# Patient Record
Sex: FEMALE | Race: WHITE | NOT HISPANIC OR LATINO | Employment: PART TIME | URBAN - METROPOLITAN AREA
[De-identification: names, ages, dates, MRNs, and addresses within clinical notes are randomized per-mention and may not be internally consistent; named-entity substitution may affect disease eponyms.]

---
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
## 2019-10-24 ENCOUNTER — HOSPITAL ENCOUNTER (EMERGENCY)
Facility: HOSPITAL | Age: 19
Discharge: HOME | End: 2019-10-25
Attending: EMERGENCY MEDICINE
Payer: MEDICAID

## 2019-10-24 VITALS
SYSTOLIC BLOOD PRESSURE: 126 MMHG | DIASTOLIC BLOOD PRESSURE: 79 MMHG | TEMPERATURE: 100 F | RESPIRATION RATE: 20 BRPM | BODY MASS INDEX: 22.49 KG/M2 | WEIGHT: 135 LBS | OXYGEN SATURATION: 99 % | HEIGHT: 65 IN | HEART RATE: 104 BPM

## 2019-10-24 DIAGNOSIS — R05.9 COUGH: ICD-10-CM

## 2019-10-24 DIAGNOSIS — J20.9 ACUTE BRONCHITIS, UNSPECIFIED ORGANISM: Primary | ICD-10-CM

## 2019-10-24 LAB
B-HCG UR QL: NEGATIVE
CTP QC/QA: YES
INFLUENZA A, MOLECULAR: NEGATIVE
INFLUENZA B, MOLECULAR: NEGATIVE
SPECIMEN SOURCE: NORMAL

## 2019-10-24 PROCEDURE — 81025 URINE PREGNANCY TEST: CPT | Performed by: EMERGENCY MEDICINE

## 2019-10-24 PROCEDURE — 99284 EMERGENCY DEPT VISIT MOD MDM: CPT | Mod: 25

## 2019-10-24 PROCEDURE — 87502 INFLUENZA DNA AMP PROBE: CPT

## 2019-10-24 PROCEDURE — 25000003 PHARM REV CODE 250: Performed by: EMERGENCY MEDICINE

## 2019-10-24 RX ORDER — IBUPROFEN 400 MG/1
400 TABLET ORAL
Status: COMPLETED | OUTPATIENT
Start: 2019-10-24 | End: 2019-10-24

## 2019-10-24 RX ADMIN — IBUPROFEN 400 MG: 400 TABLET ORAL at 11:10

## 2019-10-25 PROBLEM — J20.9 ACUTE BRONCHITIS: Status: ACTIVE | Noted: 2019-10-25

## 2019-10-25 RX ORDER — AZITHROMYCIN 250 MG/1
250 TABLET, FILM COATED ORAL DAILY
Qty: 6 TABLET | Refills: 0 | Status: SHIPPED | OUTPATIENT
Start: 2019-10-25 | End: 2022-08-16

## 2019-10-25 NOTE — ED PROVIDER NOTES
Encounter Date: 10/24/2019       History 19-year-old well-appearing female presents to the emergency department with a complaint of intermittent cough, congestion, sore throat for the last 2 weeks.  Patient reports recently exposed to influenza.  Patient reports subjective fever     Chief Complaint   Patient presents with    Cough     FACIAL FLUSH     HPI  Review of patient's allergies indicates:  No Known Allergies  Past Medical History:   Diagnosis Date    ADHD (attention deficit hyperactivity disorder)      No past surgical history on file.  No family history on file.  Social History     Tobacco Use    Smoking status: Never Smoker    Smokeless tobacco: Never Used   Substance Use Topics    Alcohol use: No    Drug use: No     Review of Systems   Constitutional: Positive for chills and fever.   HENT: Positive for congestion, postnasal drip and sore throat.    Eyes: Negative.    Respiratory: Positive for cough. Negative for shortness of breath.    Cardiovascular: Negative.    Gastrointestinal: Negative.    Genitourinary: Negative.    Musculoskeletal: Negative.    Skin: Negative.    Allergic/Immunologic: Negative.    Neurological: Negative.    Hematological: Negative.    Psychiatric/Behavioral: Negative.        Physical Exam     Initial Vitals [10/24/19 2017]   BP Pulse Resp Temp SpO2   134/87 (!) 114 16 99.4 °F (37.4 °C) 98 %      MAP       --         Physical Exam    Nursing note and vitals reviewed.  Constitutional: She appears well-developed and well-nourished.   HENT:   Head: Normocephalic and atraumatic.   Right Ear: External ear normal.   Left Ear: External ear normal.   Nose: Rhinorrhea present.   Mouth/Throat: Posterior oropharyngeal erythema present.   Neck: Normal range of motion.   Cardiovascular: Regular rhythm, S1 normal, S2 normal, normal heart sounds and normal pulses.   Pulmonary/Chest: Effort normal and breath sounds normal. No respiratory distress.   Abdominal: Soft. Bowel sounds are normal.  There is no tenderness.   Skin: Skin is warm. No rash noted.   Psychiatric: She has a normal mood and affect. Her speech is normal and behavior is normal. Thought content normal.         ED Course   Procedures  Labs Reviewed   INFLUENZA A AND B ANTIGEN    Narrative:     Specimen Source->Nasopharyngeal Swab   POCT URINE PREGNANCY          Imaging Results          X-Ray Chest PA And Lateral (In process)                  Medical Decision Making:   Initial Assessment:   Cough congestion   Differential Diagnosis:   URI, pharyngitis, sinusitis, bronchitis, pneumonia, influenza  ED Management:  19-year-old well-appearing female presents to the emergency department with complaint of URI symptoms including rhinorrhea, nasal congestion, sore throat intermittent cough and subjective fevers.  Patient concerned because she was recently exposed to someone that had influenza.  Influenza testing here is negative for a or B.  Chest x-ray reviewed and negative for any consolidative pneumonia.  Patient is oxygenating well on room air she has no evidence of respiratory distress. Patient denies pain with deep breathing.  Patient will be placed on an antibiotic she was given detailed return precautions.                      Clinical Impression:       ICD-10-CM ICD-9-CM   1. Acute bronchitis, unspecified organism J20.9 466.0   2. Cough R05 786.2                                RODRIGO Sotelo  10/25/19 0006

## 2019-10-25 NOTE — DISCHARGE INSTRUCTIONS
Motrin every 6 hr for fever, Tylenol every 4 hr  Zithromax as directed until all gone  Return for any concerns or if condition becomes worse

## 2020-02-15 ENCOUNTER — HOSPITAL ENCOUNTER (EMERGENCY)
Facility: HOSPITAL | Age: 20
Discharge: HOME | End: 2020-02-15
Attending: EMERGENCY MEDICINE
Payer: MEDICAID

## 2020-02-15 VITALS
TEMPERATURE: 98 F | RESPIRATION RATE: 16 BRPM | HEART RATE: 80 BPM | WEIGHT: 130 LBS | SYSTOLIC BLOOD PRESSURE: 137 MMHG | HEIGHT: 64 IN | DIASTOLIC BLOOD PRESSURE: 82 MMHG | BODY MASS INDEX: 22.2 KG/M2 | OXYGEN SATURATION: 100 %

## 2020-02-15 DIAGNOSIS — S09.90XA INJURY OF HEAD, INITIAL ENCOUNTER: Primary | ICD-10-CM

## 2020-02-15 DIAGNOSIS — S06.0X0A CONCUSSION WITHOUT LOSS OF CONSCIOUSNESS, INITIAL ENCOUNTER: ICD-10-CM

## 2020-02-15 LAB
B-HCG UR QL: NEGATIVE
CTP QC/QA: YES

## 2020-02-15 PROCEDURE — 81025 URINE PREGNANCY TEST: CPT | Performed by: EMERGENCY MEDICINE

## 2020-02-15 PROCEDURE — 99284 EMERGENCY DEPT VISIT MOD MDM: CPT | Mod: 25

## 2020-02-15 RX ORDER — BUTALBITAL, ACETAMINOPHEN AND CAFFEINE 50; 325; 40 MG/1; MG/1; MG/1
1 TABLET ORAL EVERY 4 HOURS PRN
Qty: 12 TABLET | Refills: 0 | Status: SHIPPED | OUTPATIENT
Start: 2020-02-15 | End: 2020-03-16

## 2020-02-16 NOTE — ED PROVIDER NOTES
Encounter Date: 2/15/2020       History     Chief Complaint   Patient presents with    Head Injury     states fell out of bed hitting back of head on dresser around 1am, denies loc, had episode of vomiting this am    Headache     Patient presents complaining of headache, vomiting. Patient states she experienced a fall out of the bed and struck her head on the dresser earlier this morning that had episode of vomiting has persisting headaches since.  She denies any blurry vision.  She currently is not nauseated.  No numbness tingling.  At the worst symptoms are moderate.  She took 2 Aleve this morning with minimal relief.        Review of patient's allergies indicates:  No Known Allergies  Past Medical History:   Diagnosis Date    ADHD (attention deficit hyperactivity disorder)     Hypertension      History reviewed. No pertinent surgical history.  History reviewed. No pertinent family history.  Social History     Tobacco Use    Smoking status: Never Smoker    Smokeless tobacco: Never Used   Substance Use Topics    Alcohol use: No    Drug use: No     Review of Systems   Neurological: Positive for dizziness and headaches.   All other systems reviewed and are negative.      Physical Exam     Initial Vitals [02/15/20 1803]   BP Pulse Resp Temp SpO2   137/82 80 16 97.7 °F (36.5 °C) 100 %      MAP       --         Physical Exam    Nursing note and vitals reviewed.  Constitutional: She appears well-developed and well-nourished.   HENT:   Head: Normocephalic.   Patient has mild tenderness to the occipital scalp.  No definitive step-off or deformity   Eyes: EOM are normal.   Neck: Normal range of motion. Neck supple.   Cardiovascular: Normal rate, regular rhythm, normal heart sounds and intact distal pulses.   Pulmonary/Chest: Breath sounds normal. No respiratory distress.   Abdominal: Soft.   Neurological: She is alert and oriented to person, place, and time. She has normal strength. No cranial nerve deficit or  sensory deficit.   Skin: Skin is warm and dry. Capillary refill takes less than 2 seconds.   Psychiatric: She has a normal mood and affect. Her behavior is normal. Judgment and thought content normal.         ED Course   Procedures  Labs Reviewed - No data to display       Imaging Results    None          Medical Decision Making:   ED Management:  Patient have symptoms of mild concussion.                                 Clinical Impression:       ICD-10-CM ICD-9-CM   1. Injury of head, initial encounter S09.90XA 959.01   2. Concussion without loss of consciousness, initial encounter S06.0X0A 850.0                             Mike Padgett MD  02/16/20 1819

## 2020-02-25 ENCOUNTER — HOSPITAL ENCOUNTER (EMERGENCY)
Facility: HOSPITAL | Age: 20
Discharge: HOME | End: 2020-02-25
Attending: EMERGENCY MEDICINE
Payer: MEDICAID

## 2020-02-25 VITALS
WEIGHT: 130 LBS | TEMPERATURE: 98 F | BODY MASS INDEX: 22.2 KG/M2 | HEIGHT: 64 IN | OXYGEN SATURATION: 99 % | SYSTOLIC BLOOD PRESSURE: 112 MMHG | DIASTOLIC BLOOD PRESSURE: 70 MMHG | RESPIRATION RATE: 17 BRPM | HEART RATE: 83 BPM

## 2020-02-25 DIAGNOSIS — M79.672 FOOT PAIN, LEFT: Primary | ICD-10-CM

## 2020-02-25 DIAGNOSIS — R52 PAIN: ICD-10-CM

## 2020-02-25 LAB
B-HCG UR QL: NEGATIVE
CTP QC/QA: YES

## 2020-02-25 PROCEDURE — 99283 EMERGENCY DEPT VISIT LOW MDM: CPT | Mod: 25

## 2020-02-25 PROCEDURE — 25000003 PHARM REV CODE 250: Performed by: NURSE PRACTITIONER

## 2020-02-25 PROCEDURE — 81025 URINE PREGNANCY TEST: CPT | Performed by: EMERGENCY MEDICINE

## 2020-02-25 RX ORDER — NAPROXEN 250 MG/1
500 TABLET ORAL
Status: COMPLETED | OUTPATIENT
Start: 2020-02-25 | End: 2020-02-25

## 2020-02-25 RX ORDER — DICLOFENAC SODIUM 50 MG/1
50 TABLET, DELAYED RELEASE ORAL 3 TIMES DAILY PRN
Qty: 20 TABLET | Refills: 2 | Status: SHIPPED | OUTPATIENT
Start: 2020-02-25 | End: 2022-08-16

## 2020-02-25 RX ADMIN — NAPROXEN 500 MG: 250 TABLET ORAL at 10:02
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
## 2020-03-30 ENCOUNTER — CLINICAL SUPPORT (OUTPATIENT)
Dept: URGENT CARE | Facility: CLINIC | Age: 20
End: 2020-03-30
Payer: MEDICAID

## 2020-03-30 VITALS
WEIGHT: 139 LBS | HEIGHT: 64 IN | OXYGEN SATURATION: 99 % | RESPIRATION RATE: 16 BRPM | BODY MASS INDEX: 23.73 KG/M2 | SYSTOLIC BLOOD PRESSURE: 112 MMHG | HEART RATE: 75 BPM | TEMPERATURE: 99 F | DIASTOLIC BLOOD PRESSURE: 68 MMHG

## 2020-03-30 DIAGNOSIS — J02.9 SORE THROAT: ICD-10-CM

## 2020-03-30 DIAGNOSIS — J02.9 VIRAL PHARYNGITIS: Primary | ICD-10-CM

## 2020-03-30 LAB
CTP QC/QA: YES
CTP QC/QA: YES
FLUAV AG NPH QL: NEGATIVE
FLUBV AG NPH QL: NEGATIVE
S PYO RRNA THROAT QL PROBE: NEGATIVE

## 2020-03-30 PROCEDURE — 87804 INFLUENZA ASSAY W/OPTIC: CPT | Mod: QW,,, | Performed by: NURSE PRACTITIONER

## 2020-03-30 PROCEDURE — 99204 PR OFFICE/OUTPT VISIT, NEW, LEVL IV, 45-59 MIN: ICD-10-PCS | Mod: 25,S$GLB,, | Performed by: NURSE PRACTITIONER

## 2020-03-30 PROCEDURE — 87880 STREP A ASSAY W/OPTIC: CPT | Mod: QW,,, | Performed by: NURSE PRACTITIONER

## 2020-03-30 PROCEDURE — 99204 OFFICE O/P NEW MOD 45 MIN: CPT | Mod: 25,S$GLB,, | Performed by: NURSE PRACTITIONER

## 2020-03-30 PROCEDURE — 87804 POCT INFLUENZA A/B: ICD-10-PCS | Mod: 59,QW,, | Performed by: NURSE PRACTITIONER

## 2020-03-30 PROCEDURE — 87880 POCT RAPID STREP A: ICD-10-PCS | Mod: QW,,, | Performed by: NURSE PRACTITIONER

## 2020-03-30 NOTE — PATIENT INSTRUCTIONS
Viral Pharyngitis (Sore Throat)    You (or your child, if your child is the patient) have pharyngitis (sore throat). This infection is caused by a virus. It can cause throat pain that is worse when swallowing, aching all over, headache, and fever. The infection may be spread by coughing, kissing, or touching others after touching your mouth or nose. Antibiotic medications do not work against viruses, so they are not used for treating this condition.  Home care  · If your symptoms are severe, rest at home. Return to work or school when you feel well enough.   · Drink plenty of fluids to avoid dehydration.  · For children: Use acetaminophen for fever, fussiness or discomfort. In infants over six months of age, you may use ibuprofen instead of acetaminophen. (NOTE: If your child has chronic liver or kidney disease or ever had a stomach ulcer or GI bleeding, talk with your doctor before using these medicines.) (NOTE: Aspirin should never be used in anyone under 18 years of age who is ill with a fever. It may cause severe liver damage.)   · For adults: You may use acetaminophen or ibuprofen to control pain or fever, unless another medicine was prescribed for this. (NOTE: If you have chronic liver or kidney disease or ever had a stomach ulcer or GI bleeding, talk with your doctor before using these medicines.)  · Throat lozenges or numbing throat sprays can help reduce pain. Gargling with warm salt water will also help reduce throat pain. For this, dissolve 1/2 teaspoon of salt in 1 glass of warm water. To help soothe a sore throat, children can sip on juice or a popsicle. Children 5 years and older can also suck on a lollipop or hard candy.  · Avoid salty or spicy foods, which can be irritating to the throat.  Follow-up care  Follow up with your healthcare provider or our staff if you are not improving over the next week.  When to seek medical advice  Call your healthcare provider right away if any of these  occur:  · Fever as directed by your doctor.  For children, seek care if:  ¨ Your child is of any age and has repeated fevers above 104°F (40°C).  ¨ Your child is younger than 2 years of age and has a fever of 100.4°F (38°C) that continues for more than 1 day.  ¨ Your child is 2 years old or older and has a fever of 100.4°F (38°C) that continues for more than 3 days.  · New or worsening ear pain, sinus pain, or headache  · Painful lumps in the back of neck  · Stiff neck  · Lymph nodes are getting larger  · Inability to swallow liquids, excessive drooling, or inability to open mouth wide due to throat pain  · Signs of dehydration (very dark urine or no urine, sunken eyes, dizziness)  · Trouble breathing or noisy breathing  · Muffled voice  · New rash  · Child appears to be getting sicker  Date Last Reviewed: 4/13/2015  © 6476-8720 [employer]. [address] [place] [address], [place] [address]. All rights reserved. This information is not intended as a substitute for professional medical care. Always follow your healthcare professional's instructions.

## 2020-03-30 NOTE — PROGRESS NOTES
"Subjective:       Patient ID: Jose G Fischer is a 19 y.o. female.    Vitals:  height is 5' 4" (1.626 m) and weight is 63 kg (139 lb). Her oral temperature is 98.7 °F (37.1 °C). Her blood pressure is 112/68 and her pulse is 75. Her respiration is 16 and oxygen saturation is 99%.     Chief Complaint: Sore Throat    Jose G Fischer is a 19 year old female presenting to the clinic with a 2-3 day history of sore throat. She has had no fever or other URI symptoms and is tolerating PO intake without difficulty. She has taken ibuprofen for her symptoms with minimal relief.     Sore Throat    This is a new problem. The current episode started in the past 7 days. The problem has been gradually worsening. There has been no fever. Pertinent negatives include no congestion, coughing, diarrhea, headaches, shortness of breath, trouble swallowing or vomiting. She has tried nothing for the symptoms.       Constitution: Negative for chills, fatigue and fever.   HENT: Positive for sore throat. Negative for congestion and trouble swallowing.    Neck: Negative for painful lymph nodes.   Cardiovascular: Negative for chest pain and leg swelling.   Eyes: Negative for double vision and blurred vision.   Respiratory: Negative for cough and shortness of breath.    Gastrointestinal: Negative for nausea, vomiting and diarrhea.   Genitourinary: Negative for dysuria, frequency, urgency and history of kidney stones.   Musculoskeletal: Negative for joint pain, joint swelling, muscle cramps and muscle ache.   Skin: Negative for color change, pale, rash and bruising.   Allergic/Immunologic: Negative for seasonal allergies.   Neurological: Negative for dizziness, history of vertigo, light-headedness, passing out and headaches.   Hematologic/Lymphatic: Negative for swollen lymph nodes.   Psychiatric/Behavioral: Negative for nervous/anxious, sleep disturbance and depression. The patient is not nervous/anxious.        Objective:      Physical Exam "   Constitutional: She is oriented to person, place, and time. She appears well-developed and well-nourished. She is cooperative.  Non-toxic appearance. She does not appear ill. No distress.   HENT:   Head: Normocephalic and atraumatic.   Right Ear: Hearing, tympanic membrane, external ear and ear canal normal.   Left Ear: Hearing, tympanic membrane, external ear and ear canal normal.   Nose: Nose normal. No mucosal edema, rhinorrhea or nasal deformity. No epistaxis. Right sinus exhibits no maxillary sinus tenderness and no frontal sinus tenderness. Left sinus exhibits no maxillary sinus tenderness and no frontal sinus tenderness.   Mouth/Throat: Uvula is midline, oropharynx is clear and moist and mucous membranes are normal. No trismus in the jaw. Normal dentition. No uvula swelling. No posterior oropharyngeal erythema. Tonsils are 2+ on the right. Tonsils are 2+ on the left.   Eyes: Conjunctivae and lids are normal. Right eye exhibits no discharge. Left eye exhibits no discharge. No scleral icterus.   Neck: Trachea normal, normal range of motion, full passive range of motion without pain and phonation normal. Neck supple.   Cardiovascular: Normal rate, regular rhythm, normal heart sounds, intact distal pulses and normal pulses.   Pulmonary/Chest: Effort normal and breath sounds normal. No respiratory distress.   Abdominal: Soft. Normal appearance and bowel sounds are normal. She exhibits no distension, no pulsatile midline mass and no mass. There is no tenderness.   Musculoskeletal: Normal range of motion. She exhibits no edema or deformity.   Neurological: She is alert and oriented to person, place, and time. She exhibits normal muscle tone. Coordination normal.   Skin: Skin is warm, dry, intact, not diaphoretic and not pale.   Psychiatric: She has a normal mood and affect. Her speech is normal and behavior is normal. Judgment and thought content normal. Cognition and memory are normal.   Nursing note and vitals  reviewed.        Assessment:       1. Viral pharyngitis    2. Sore throat        Plan:       The patient's symptoms are consistent with viral pharyngitis.  I do not think the patient has strep pharyngitis based upon the [facility] Criteria.  The patient doesn't appear to have any stridor, drooling, hoarseness, uvular deviation, facial swelling, meningismus to suggest peritonsillar abscess, retropharyngeal abscess, epiglotitis, meningitis, airway compromise.  Will treat with supportive care.  Will send throat culture and notify patient of abnormal results.     Viral pharyngitis    Sore throat  -     POCT rapid strep A  -     POCT Influenza A/B  -     Culture, Aerobic and Anaerobic

## 2020-04-03 LAB
BACTERIA SPEC AEROBE CULT: ABNORMAL
BACTERIA SPEC ANAEROBE CULT: ABNORMAL
BACTERIA SPEC CULT: ABNORMAL
BACTERIA SPEC CULT: ABNORMAL

## 2020-07-01 ENCOUNTER — CLINICAL SUPPORT (OUTPATIENT)
Dept: URGENT CARE | Facility: CLINIC | Age: 20
End: 2020-07-01
Payer: MEDICAID

## 2020-07-01 VITALS
DIASTOLIC BLOOD PRESSURE: 77 MMHG | WEIGHT: 138 LBS | RESPIRATION RATE: 18 BRPM | OXYGEN SATURATION: 98 % | BODY MASS INDEX: 23.56 KG/M2 | SYSTOLIC BLOOD PRESSURE: 118 MMHG | HEIGHT: 64 IN | TEMPERATURE: 98 F | HEART RATE: 76 BPM

## 2020-07-01 DIAGNOSIS — R51.9 ACUTE NONINTRACTABLE HEADACHE, UNSPECIFIED HEADACHE TYPE: Primary | ICD-10-CM

## 2020-07-01 PROCEDURE — 99214 PR OFFICE/OUTPT VISIT, EST, LEVL IV, 30-39 MIN: ICD-10-PCS | Mod: S$GLB,,, | Performed by: NURSE PRACTITIONER

## 2020-07-01 PROCEDURE — 99214 OFFICE O/P EST MOD 30 MIN: CPT | Mod: S$GLB,,, | Performed by: NURSE PRACTITIONER

## 2020-07-01 RX ORDER — KETOROLAC TROMETHAMINE 30 MG/ML
30 INJECTION, SOLUTION INTRAMUSCULAR; INTRAVENOUS
Status: COMPLETED | OUTPATIENT
Start: 2020-07-01 | End: 2020-07-01

## 2020-07-01 RX ORDER — DICLOFENAC SODIUM 50 MG/1
50 TABLET, DELAYED RELEASE ORAL 3 TIMES DAILY PRN
Qty: 30 TABLET | Refills: 0 | Status: SHIPPED | OUTPATIENT
Start: 2020-07-01 | End: 2022-08-16

## 2020-07-01 RX ADMIN — KETOROLAC TROMETHAMINE 30 MG: 30 INJECTION, SOLUTION INTRAMUSCULAR; INTRAVENOUS at 03:07

## 2020-07-01 NOTE — LETTER
July 1, 2020      [facility]  [address]  [facility] [facility] [place] [address]  Phone: [phone]       Patient: Jose G Fischer   YOB: 2000  Date of Visit: 07/01/2020    To Whom It May Concern:    Lyly Fischer  was at [facility] on 07/01/2020. She may return to work/school on 7/2/2020 with no restrictions. If you have any questions or concerns, or if I can be of further assistance, please do not hesitate to contact me.    Sincerely,    RODRIGO Toro

## 2020-07-01 NOTE — PATIENT INSTRUCTIONS
"  Self-Care for Headaches  Most headaches aren't serious and can be relieved with self-care. But some headaches may be a sign of another health problem like eye trouble or high blood pressure. To find the best treatment, learn what kind of headaches you get. For tension headaches, self-care will usually help. To treat migraines, ask your healthcare provider for advice. It is also possible to get both tension and migraine headaches. Self-care involves relieving the pain and avoiding headache triggers if you can.    Ways to reduce pain and tension  Try these steps:  · Apply a cold compress or ice pack to the pain site.  · Drink fluids. If nausea makes it hard to drink, try sucking on ice.  · Rest. Protect yourself from bright light and loud noises.  · Calm your emotions by imagining a peaceful scene.  · Massage tight neck, shoulder, and head muscles.  · To relax muscles, soak in a hot bath or use a hot shower.  Use medicines  Aspirin or aspirin substitutes, such as ibuprofen and acetaminophen, can relieve headache. Remember: Never give aspirin to anyone 18 years old or younger because of the risk of developing Reye syndrome. Use pain medicines only when necessary.  Track your headaches  Keeping a headache diary can help you and your healthcare provider identify what's causing your headaches:  · Note when each headache happens.  · Identify your activities and the foods you've eaten 6 to 8 hours before the headache began.  · Look for any trends or "triggers."  Signs of tension headache  Any of the following can be signs:  · Dull pain or feeling of pressure in a tight band around your head  · Pain in your neck or shoulders  · Headache without a definite beginning or end  · Headache after an activity such as driving or working on a computer  Signs of migraine  Any of the following can be signs:  · Throbbing pain on one or both sides of your head  · Nausea or vomiting  · Extreme sensitivity to light, sound, and " "smells  · Bright spots, flashes, or other visual changes  · Pain or nausea so severe that you can't continue your daily activities  Call your healthcare provider   If you have any of the following symptoms, contact your healthcare provider:  · A headache that lingers after a recent injury or bump to the head.  · A fever with a stiff neck or pain when you bend your head toward your chest.  · A headache along with slurred speech, changes in your vision, or numbness or weakness in your arms or legs.  · A headache for longer than 3 days.  · Frequent headaches, especially in the morning.  · Headaches with seizures   · Seek immediate medical attention if you have a headache that you would call "the worst headache you have ever had."   Date Last Reviewed: 10/4/2015  © 6723-1168 [employer]. [address] [place] [address], [place] [address]. All rights reserved. This information is not intended as a substitute for professional medical care. Always follow your healthcare professional's instructions.        "

## 2020-07-01 NOTE — PROGRESS NOTES
"Subjective:       Patient ID: Jose G Fischer is a 20 y.o. female.    Vitals:  height is 5' 4" (1.626 m) and weight is 62.6 kg (138 lb). Her oral temperature is 98.1 °F (36.7 °C). Her blood pressure is 118/77 and her pulse is 76. Her respiration is 18 and oxygen saturation is 98%.     Chief Complaint: Migraine (Worse from yesterday)    Pt states she woke up with a headache yesterday morning and took 800mg of ibuprofen with relief. Pt states that when she woke up again this morning, she had the headache again. Pt describes the headache as across her forehead and on both sides of her head with throbbing and tightening. Reports taking 800mg of ibuprofen again this morning with mild improvement. Endorses light sensitivity upon wakening this morning, but not at this time. Denies N,V, blurry vision, or sound sensitivity.     Migraine   This is a new problem. The current episode started yesterday. The problem occurs constantly. The problem has been gradually improving. The pain quality is similar to prior headaches. The quality of the pain is described as throbbing and band-like. Pertinent negatives include no coughing, ear pain, eye redness, fever, seizures, sore throat or vomiting. Nothing aggravates the symptoms. She has tried NSAIDs for the symptoms. The treatment provided mild relief.       Constitution: Negative for appetite change, chills and fever.   HENT: Negative for ear pain, congestion and sore throat.    Neck: Negative for painful lymph nodes.   Eyes: Negative for eye discharge and eye redness.   Respiratory: Negative for cough.    Gastrointestinal: Negative for vomiting and diarrhea.   Genitourinary: Negative for dysuria.   Musculoskeletal: Negative for muscle ache.   Skin: Negative for rash.   Neurological: Positive for headaches. Negative for seizures.   Hematologic/Lymphatic: Negative for swollen lymph nodes.       Objective:      Physical Exam   Constitutional: She is oriented to person, place, and " time. She appears well-developed.  Non-toxic appearance. She does not appear ill. No distress.   HENT:   Head: Normocephalic and atraumatic.   Right Ear: Hearing, tympanic membrane, external ear and ear canal normal.   Left Ear: Hearing, tympanic membrane, external ear and ear canal normal.   Nose: Nose normal. No mucosal edema, rhinorrhea or nasal deformity. No epistaxis. Right sinus exhibits no maxillary sinus tenderness and no frontal sinus tenderness. Left sinus exhibits no maxillary sinus tenderness and no frontal sinus tenderness.   Mouth/Throat: Uvula is midline, oropharynx is clear and moist and mucous membranes are normal. No trismus in the jaw. Normal dentition. No uvula swelling. No posterior oropharyngeal erythema.   Eyes: Pupils are equal, round, and reactive to light. Conjunctivae, EOM and lids are normal. No scleral icterus.   Neck: Trachea normal, normal range of motion, full passive range of motion without pain and phonation normal. Neck supple. No neck rigidity.   Cardiovascular: Normal rate, regular rhythm, normal heart sounds and normal pulses.   Pulmonary/Chest: Effort normal and breath sounds normal. No respiratory distress.   Abdominal: Soft. Normal appearance and bowel sounds are normal. She exhibits no distension. There is no abdominal tenderness.   Musculoskeletal: Normal range of motion.         General: No deformity.   Neurological: She is alert and oriented to person, place, and time. No cranial nerve deficit. She exhibits normal muscle tone. Coordination normal.   Skin: Skin is warm, dry, intact, not diaphoretic and not pale.   Psychiatric: Her speech is normal and behavior is normal. Judgment and thought content normal.   Nursing note and vitals reviewed.        Assessment:       1. Acute nonintractable headache, unspecified headache type        Plan:         Acute nonintractable headache, unspecified headache type    Other orders  -     ketorolac injection 30 mg  -     diclofenac  (VOLTAREN) 50 MG EC tablet; Take 1 tablet (50 mg total) by mouth 3 (three) times daily as needed.  Dispense: 30 tablet; Refill: 0

## 2020-09-10 ENCOUNTER — OFFICE VISIT (OUTPATIENT)
Dept: URGENT CARE | Facility: CLINIC | Age: 20
End: 2020-09-10
Payer: MEDICAID

## 2020-09-10 VITALS
BODY MASS INDEX: 22.88 KG/M2 | OXYGEN SATURATION: 99 % | HEIGHT: 64 IN | WEIGHT: 134 LBS | HEART RATE: 82 BPM | SYSTOLIC BLOOD PRESSURE: 126 MMHG | RESPIRATION RATE: 16 BRPM | DIASTOLIC BLOOD PRESSURE: 82 MMHG

## 2020-09-10 DIAGNOSIS — R00.2 PALPITATION: Primary | ICD-10-CM

## 2020-09-10 PROCEDURE — 99214 OFFICE O/P EST MOD 30 MIN: CPT | Mod: S$GLB,,, | Performed by: NURSE PRACTITIONER

## 2020-09-10 PROCEDURE — 93000 ELECTROCARDIOGRAM COMPLETE: CPT | Mod: S$GLB,,, | Performed by: NURSE PRACTITIONER

## 2020-09-10 PROCEDURE — 93000 PR ELECTROCARDIOGRAM, COMPLETE: ICD-10-PCS | Mod: S$GLB,,, | Performed by: NURSE PRACTITIONER

## 2020-09-10 PROCEDURE — 99214 PR OFFICE/OUTPT VISIT, EST, LEVL IV, 30-39 MIN: ICD-10-PCS | Mod: S$GLB,,, | Performed by: NURSE PRACTITIONER

## 2020-09-11 NOTE — PATIENT INSTRUCTIONS
"  Heart Palpitations    Palpitations are the feeling that your heart is beating hard, fast, or irregular. Some describe it as "pounding" or "skipped beats." Palpitations may occur in someone with heart disease, but can also occur in a healthy person.  Heart-related causes:  · Arrhythmia (a change from the heart's normal rhythm)  · Heart valve disease  · Disease of the heart muscle  · Coronary artery disease  · High blood pressure  Non-heart-related causes:  · Certain medicines (such as asthma inhalers and decongestants)  · Some herbal supplements, energy drinks and pills, and weight loss pills  · Illegal stimulant drugs (such as cocaine, crank, methamphetamine, PCP, bath salts, ecstasy)  · Caffeine, alcohol, and tobacco  · Medical conditions such as thyroid disease, anemia, anxiety, and panic disorder  Sometimes the cause can't be found.  Home care  Follow these home care tips:  · Avoid excess caffeine, alcohol, tobacco, and any stimulant drugs.  · Tell your doctor about any prescription or over-the-counter or herbal medicines you take.  Follow-up care  · Follow up with your doctor, or as advised.  Call 911  This is the fastest and safest way to get to the emergency department. The paramedics can also begin treatment on the way to the hospital, if needed.  Don't wait until your symptoms are severe to call 911. These are reasons to call 911:  · Chest pain  · Shortness of breath  · Feeling lightheaded, faint, or dizzy  · Fainting or loss of consciousness  · Very irregular heartbeat  · Rapid heartbeat that makes you uncomfortable  · Slower than usual heart rate associated with symptoms  · Slower than usual heart rate  · Chest pain with weakness, dizziness, heavy sweating, nausea, or vomiting  · Extreme drowsiness or confusion  · Weakness of an arm or leg, or on 1 side of the face  · Difficulty with speech or vision  When to seek medical advice  Get prompt medical attention if you have palpitations and any of the " following:  · Weakness  · Dizziness  · Lightheadedness  · Fainting  Date Last Reviewed: 4/27/2016  © 7949-1120 [employer]. [address] [place] [address], [place] [address]. All rights reserved. This information is not intended as a substitute for professional medical care. Always follow your healthcare professional's instructions.

## 2020-09-11 NOTE — PROGRESS NOTES
"Subjective:       Patient ID: Jose G Fischer is a 20 y.o. female.    Vitals:  height is 5' 4" (1.626 m) and weight is 60.8 kg (134 lb). Her blood pressure is 126/82 and her pulse is 82. Her respiration is 16 and oxygen saturation is 99%.     Chief Complaint: Chest Pain    HEART RATE GOT UP  LAST NIGHT AND STAYED FOR HOURS UNABLE TO SLEEP.   HAPPENED AGAIN TODAY.  HAS CHEST PAINS DURING THIS.   SHE DOES SMOKE CIGARETTES .  Palpitations   This is a recurrent problem. The current episode started yesterday. The problem occurs intermittently. The problem has been resolved. The symptoms are aggravated by unknown (does smoke cigarettes, energy drinks). Pertinent negatives include no coughing, fever or vomiting. She has tried nothing for the symptoms. The treatment provided no relief.       Constitution: Negative for appetite change, chills and fever.   HENT: Negative for ear pain, congestion and sore throat.    Neck: Negative for painful lymph nodes.   Cardiovascular: Positive for palpitations (felt fast heart rate last night).   Eyes: Negative for eye discharge and eye redness.   Respiratory: Negative for cough.    Gastrointestinal: Negative for vomiting and diarrhea.   Genitourinary: Negative for dysuria.   Musculoskeletal: Negative for muscle ache.   Skin: Negative for rash.   Neurological: Negative for headaches and seizures.   Hematologic/Lymphatic: Negative for swollen lymph nodes.       Objective:      Physical Exam   Constitutional: She is oriented to person, place, and time. She appears well-developed. She is cooperative.  Non-toxic appearance. She does not appear ill. No distress.   HENT:   Head: Normocephalic and atraumatic.   Ears:   Right Ear: Hearing, tympanic membrane, external ear and ear canal normal.   Left Ear: Hearing, tympanic membrane, external ear and ear canal normal.   Nose: Nose normal. No mucosal edema, rhinorrhea or nasal deformity. No epistaxis. Right sinus exhibits no maxillary " sinus tenderness and no frontal sinus tenderness. Left sinus exhibits no maxillary sinus tenderness and no frontal sinus tenderness.   Mouth/Throat: Uvula is midline, oropharynx is clear and moist and mucous membranes are normal. No trismus in the jaw. Normal dentition. No uvula swelling. No posterior oropharyngeal erythema.   Eyes: Conjunctivae and lids are normal. Right eye exhibits no discharge. Left eye exhibits no discharge. No scleral icterus.   Neck: Trachea normal, normal range of motion, full passive range of motion without pain and phonation normal. Neck supple.   Cardiovascular: Normal rate, regular rhythm, normal heart sounds and normal pulses.   Pulmonary/Chest: Effort normal and breath sounds normal. No respiratory distress.   Abdominal: Soft. Normal appearance and bowel sounds are normal. She exhibits no distension, no pulsatile midline mass and no mass. There is no abdominal tenderness.   Musculoskeletal: Normal range of motion.         General: No deformity.   Neurological: She is alert and oriented to person, place, and time. She exhibits normal muscle tone. Coordination normal.   Skin: Skin is warm, dry, intact, not diaphoretic and not pale. Psychiatric: Her speech is normal and behavior is normal. Judgment and thought content normal.   Nursing note and vitals reviewed.        Assessment:       1. Palpitation        Plan:     ekg reviewed by me, NSR, no acute process identified  Recommended to go to ER for worsening complaint, otherwise follow up with PCP  Recommended to decrease cigarette and energy drink use    Palpitation  -     Ambulatory referral/consult to Family Practice

## 2020-09-29 ENCOUNTER — OFFICE VISIT (OUTPATIENT)
Dept: URGENT CARE | Facility: CLINIC | Age: 20
End: 2020-09-29
Payer: MEDICAID

## 2020-09-29 VITALS
TEMPERATURE: 99 F | OXYGEN SATURATION: 98 % | RESPIRATION RATE: 16 BRPM | BODY MASS INDEX: 22.66 KG/M2 | WEIGHT: 136 LBS | SYSTOLIC BLOOD PRESSURE: 131 MMHG | HEART RATE: 88 BPM | DIASTOLIC BLOOD PRESSURE: 76 MMHG | HEIGHT: 65 IN

## 2020-09-29 DIAGNOSIS — S05.01XA ABRASION OF RIGHT CORNEA, INITIAL ENCOUNTER: Primary | ICD-10-CM

## 2020-09-29 PROCEDURE — 99214 PR OFFICE/OUTPT VISIT, EST, LEVL IV, 30-39 MIN: ICD-10-PCS | Mod: S$GLB,,, | Performed by: NURSE PRACTITIONER

## 2020-09-29 PROCEDURE — 99214 OFFICE O/P EST MOD 30 MIN: CPT | Mod: S$GLB,,, | Performed by: NURSE PRACTITIONER

## 2020-09-29 RX ORDER — NAPROXEN 500 MG/1
500 TABLET ORAL 2 TIMES DAILY
Qty: 60 TABLET | Refills: 0 | Status: SHIPPED | OUTPATIENT
Start: 2020-09-29 | End: 2023-11-28 | Stop reason: SDUPTHER

## 2020-09-29 RX ORDER — CIPROFLOXACIN HYDROCHLORIDE 3 MG/ML
1 SOLUTION/ DROPS OPHTHALMIC EVERY 4 HOURS
Qty: 10 ML | Refills: 0 | Status: SHIPPED | OUTPATIENT
Start: 2020-09-29 | End: 2022-08-16

## 2020-09-29 RX ORDER — ESTRADIOL AND NORETHINDRONE ACETATE 1; .5 MG/1; MG/1
1 TABLET ORAL DAILY
COMMUNITY
End: 2023-02-22

## 2020-09-29 NOTE — PROGRESS NOTES
"Subjective:       Patient ID: Jose G Fischer is a 20 y.o. female.    Vitals:  height is 5' 5" (1.651 m) and weight is 61.7 kg (136 lb). Her temperature is 98.8 °F (37.1 °C). Her blood pressure is 131/76 and her pulse is 88. Her respiration is 16 and oxygen saturation is 98%.     Chief Complaint: Eye Pain (Right Eye)    Pt was standing next to a large plant yesterday when she turned and it hit her in the R eye. She complains of pain and blurred vision to eye.   Her vision in the effected is is 20/30 and in the L eye 20/20      Constitution: Negative for chills and fever.   HENT: Negative for congestion and sinus pain.    Eyes: Positive for eye pain, eye redness and blurred vision. Negative for eye trauma, foreign body in eye, eye discharge, eye itching, photophobia, vision loss, double vision and eyelid swelling.   Gastrointestinal: Negative for nausea and vomiting.   Genitourinary: Negative for history of kidney stones.   Skin: Negative for rash.   Allergic/Immunologic: Negative for seasonal allergies and itching.   Neurological: Negative for headaches.       Objective:      Physical Exam   Constitutional: She is oriented to person, place, and time. She appears well-developed.   HENT:   Head: Normocephalic and atraumatic.   Ears:   Right Ear: External ear normal.   Left Ear: External ear normal.   Nose: Nose normal.   Mouth/Throat: Oropharynx is clear and moist.   Eyes: Pupils are equal, round, and reactive to light. EOM and lids are normal. Right eye exhibits no exudate. Right conjunctiva is injected.      Comments: fluorescein uptake noted to right lateral eye  extraocular movement intact  Neck: Trachea normal, full passive range of motion without pain and phonation normal. Neck supple.   Musculoskeletal: Normal range of motion.   Neurological: She is alert and oriented to person, place, and time.   Skin: Skin is warm, dry and intact. Psychiatric: Her speech is normal and behavior is normal. Judgment and " thought content normal.   Nursing note and vitals reviewed.        Assessment:       1. Abrasion of right cornea, initial encounter        Plan:         Abrasion of right cornea, initial encounter  -     ciprofloxacin HCl (CILOXAN) 0.3 % ophthalmic solution; Place 1 drop into the right eye every 4 (four) hours.  Dispense: 10 mL; Refill: 0  -     naproxen (NAPROSYN) 500 MG tablet; Take 1 tablet (500 mg total) by mouth 2 (two) times daily.  Dispense: 60 tablet; Refill: 0    Call or RTC if symptoms worsen or do not begin to improve

## 2020-09-29 NOTE — PATIENT INSTRUCTIONS
Corneal Injury    An eye injury can hurt your cornea. Your cornea is the clear layer on the front of your eye. It protects your eye from dust and germs, and helps filter out harmful UV (ultraviolet) rays. The cornea also helps to focus light entering your eye. Your cornea is made of strong proteins, but it can be damaged. A slight cut or scratch (abrasion) to the cornea can be very painful. But that is often minor and can heal within 1 or 2 days. A bad abrasion or a hole (puncture) in the cornea can be very serious. These are medical emergencies.  Something in your eye  If you think you have something small in your eye, flush it with water right away. Pull your upper lid out and over your bottom lid. This will help increase the flow of tears across your eye. If these methods dont work, call your healthcare provider. Never try to remove an object from your eye that doesnt flush out easily with water. Doing so may cause more damage.  When to go to the emergency room (ER)  Call 911 or your local emergency number if you have:  · Severe eye pain  · A puncture injury or bad abrasion  · Something in your eye that you cant flush out with water  · A very swollen or painful eye after removing an object  · A chemical burn  · An object embedded in your eye. (Cover both eyes with a sterile compress and keep both eyes closed while you wait for help. DO NOT put any pressure on your eyes.)  What to expect in the ER  For minor abrasions   Minor abrasions are usually treated with eye drops or ointment. You may be given antibiotics to prevent infection. Most abrasions heal in 1 or 2 days. To help rule out more serious injuries, you may have tests including:  · A standard eye exam to check how well you can see  · A Carlton test, which uses a special dye to look for severe eye damage  Depending on the results of these tests, you may be referred to an eye specialist (ophthalmologist).  For serious abrasions or punctures  You will be  referred directly to an ophthalmologist for emergency treatment. An eye specialist is needed to reduce further damage and possible vision loss.  Date Last Reviewed: 8/12/2015 © 2000-2017 [employer]. [address] [place] [address], [place] [address]. All rights reserved. This information is not intended as a substitute for professional medical care. Always follow your healthcare professional's instructions.        Corneal Abrasion    You have received a scratch or scrape (abrasion) to your cornea. The cornea is the clear part in the front of the eye. This sensitive area is very painful when injured. You may make tears frequently, and your vision may be blurry until the injury heals. You may be sensitive to light.  This part of the body heals quickly. You can expect the pain to go away within 24 to 48 hours. If the abrasion is large or deep, your doctor may apply an eye patch, although this is not always done. An antibiotic ointment or eye drops may also be used to prevent infection.  Numbing drops may be used to relieve the pain temporarily so that your eyes can be examined. However, these drops cannot be prescribed for home use because that would prevent healing and lead to more serious problems. Also, if you cant feel your eye, there is a chance of accidentally injuring it further without knowing it.  Home care  · A cold pack (ice in a plastic bag, wrapped in a towel) may be applied over the eye (or eye patch) for 20 minutes at a time, to reduce pain.  · You may use acetaminophen or ibuprofen to control pain, unless another pain medicine was prescribed. Note: If you have chronic liver or kidney disease or ever had a stomach ulcer or GI bleeding, talk with your doctor before using these medicines.  · Rest your eyes and do not read until symptoms are gone.  · If you use contact lenses, do not wear them until all symptoms are gone.  · If your vision is affected by the corneal abrasion or if an eye patch was  applied, do not drive a motor vehicle or operate machinery until all symptoms are gone. You may have trouble judging distances using only one eye.  · If your eyes are sensitive to light, try wearing sunglasses, or stay indoors until symptoms go away.  Follow-up care  Follow up with your health care provider, or as advised.  · If no patch was put on your eye, and used but the pain continues for more than 48 hours, you should have another exam. Return to this facility or contact your health care provider to arrange this.  · If your eye was patched and you were asked to remove the patch yourself, see your health care provider. You may also return to this facility if you still have pain after the patch is removed.  · If you were given a return appointment for patch removal and re-examination, be sure to keep the appointment. Leaving the patch in place longer than advised could be harmful.  When to seek medical advice  Call your health care provider right away if any of these occur.  · Increasing eye pain or pain that does not improve after 24 hours  · Discharge from the eye  · Increasing redness of the eye or swelling of the eyelids  · Worsening vision  · Symptoms that worsen after the abrasion has healed  Date Last Reviewed: 6/14/2015  © 2103-4413 [employer]. [address] [place] [address], [place] [address]. All rights reserved. This information is not intended as a substitute for professional medical care. Always follow your healthcare professional's instructions.

## 2020-10-01 ENCOUNTER — TELEPHONE (OUTPATIENT)
Dept: URGENT CARE | Facility: CLINIC | Age: 20
End: 2020-10-01

## 2021-07-05 ENCOUNTER — HOSPITAL ENCOUNTER (EMERGENCY)
Facility: HOSPITAL | Age: 21
Discharge: HOME | End: 2021-07-05
Attending: EMERGENCY MEDICINE
Payer: MEDICAID

## 2021-07-05 VITALS
DIASTOLIC BLOOD PRESSURE: 78 MMHG | HEIGHT: 65 IN | TEMPERATURE: 99 F | BODY MASS INDEX: 22.49 KG/M2 | HEART RATE: 89 BPM | WEIGHT: 135 LBS | SYSTOLIC BLOOD PRESSURE: 138 MMHG | OXYGEN SATURATION: 99 % | RESPIRATION RATE: 20 BRPM

## 2021-07-05 DIAGNOSIS — U07.1 COVID-19 VIRUS DETECTED: Primary | ICD-10-CM

## 2021-07-05 DIAGNOSIS — B34.9 VIRAL SYNDROME: ICD-10-CM

## 2021-07-05 LAB
B-HCG UR QL: NEGATIVE
BILIRUB UR QL STRIP: NEGATIVE
CLARITY UR: CLEAR
COLOR UR: YELLOW
CTP QC/QA: YES
GLUCOSE UR QL STRIP: NEGATIVE
HGB UR QL STRIP: NEGATIVE
INFLUENZA A, MOLECULAR: NEGATIVE
INFLUENZA B, MOLECULAR: NEGATIVE
KETONES UR QL STRIP: NEGATIVE
LEUKOCYTE ESTERASE UR QL STRIP: NEGATIVE
NITRITE UR QL STRIP: NEGATIVE
PH UR STRIP: 8 [PH] (ref 5–8)
PROT UR QL STRIP: NEGATIVE
SARS-COV-2 RDRP RESP QL NAA+PROBE: POSITIVE
SP GR UR STRIP: 1.01 (ref 1–1.03)
SPECIMEN SOURCE: NORMAL
URN SPEC COLLECT METH UR: NORMAL
UROBILINOGEN UR STRIP-ACNC: 1 EU/DL

## 2021-07-05 PROCEDURE — 81003 URINALYSIS AUTO W/O SCOPE: CPT | Performed by: EMERGENCY MEDICINE

## 2021-07-05 PROCEDURE — U0002 COVID-19 LAB TEST NON-CDC: HCPCS | Performed by: EMERGENCY MEDICINE

## 2021-07-05 PROCEDURE — 87502 INFLUENZA DNA AMP PROBE: CPT | Performed by: EMERGENCY MEDICINE

## 2021-07-05 PROCEDURE — 99284 EMERGENCY DEPT VISIT MOD MDM: CPT

## 2021-07-05 PROCEDURE — 81025 URINE PREGNANCY TEST: CPT | Performed by: EMERGENCY MEDICINE

## 2022-08-16 ENCOUNTER — OFFICE VISIT (OUTPATIENT)
Dept: FAMILY MEDICINE | Facility: CLINIC | Age: 22
End: 2022-08-16
Payer: MEDICAID

## 2022-08-16 VITALS
TEMPERATURE: 98 F | OXYGEN SATURATION: 99 % | WEIGHT: 152 LBS | SYSTOLIC BLOOD PRESSURE: 120 MMHG | DIASTOLIC BLOOD PRESSURE: 88 MMHG | RESPIRATION RATE: 19 BRPM | BODY MASS INDEX: 25.33 KG/M2 | HEART RATE: 81 BPM | HEIGHT: 65 IN

## 2022-08-16 DIAGNOSIS — F51.01 PRIMARY INSOMNIA: ICD-10-CM

## 2022-08-16 DIAGNOSIS — Z11.59 ENCOUNTER FOR HEPATITIS C SCREENING TEST FOR LOW RISK PATIENT: ICD-10-CM

## 2022-08-16 DIAGNOSIS — Z12.4 CERVICAL CANCER SCREENING: ICD-10-CM

## 2022-08-16 DIAGNOSIS — Z76.89 ESTABLISHING CARE WITH NEW DOCTOR, ENCOUNTER FOR: Primary | ICD-10-CM

## 2022-08-16 DIAGNOSIS — Z79.899 ENCOUNTER FOR LONG-TERM (CURRENT) USE OF OTHER MEDICATIONS: ICD-10-CM

## 2022-08-16 DIAGNOSIS — F41.9 ANXIETY: ICD-10-CM

## 2022-08-16 DIAGNOSIS — G44.201 ACUTE INTRACTABLE TENSION-TYPE HEADACHE: ICD-10-CM

## 2022-08-16 PROCEDURE — 3008F BODY MASS INDEX DOCD: CPT | Mod: CPTII,S$GLB,, | Performed by: PHYSICIAN ASSISTANT

## 2022-08-16 PROCEDURE — 1159F PR MEDICATION LIST DOCUMENTED IN MEDICAL RECORD: ICD-10-PCS | Mod: CPTII,S$GLB,, | Performed by: PHYSICIAN ASSISTANT

## 2022-08-16 PROCEDURE — 3074F PR MOST RECENT SYSTOLIC BLOOD PRESSURE < 130 MM HG: ICD-10-PCS | Mod: CPTII,S$GLB,, | Performed by: PHYSICIAN ASSISTANT

## 2022-08-16 PROCEDURE — 99385 PR PREVENTIVE VISIT,NEW,18-39: ICD-10-PCS | Mod: S$GLB,,, | Performed by: PHYSICIAN ASSISTANT

## 2022-08-16 PROCEDURE — 3079F PR MOST RECENT DIASTOLIC BLOOD PRESSURE 80-89 MM HG: ICD-10-PCS | Mod: CPTII,S$GLB,, | Performed by: PHYSICIAN ASSISTANT

## 2022-08-16 PROCEDURE — 3079F DIAST BP 80-89 MM HG: CPT | Mod: CPTII,S$GLB,, | Performed by: PHYSICIAN ASSISTANT

## 2022-08-16 PROCEDURE — 3074F SYST BP LT 130 MM HG: CPT | Mod: CPTII,S$GLB,, | Performed by: PHYSICIAN ASSISTANT

## 2022-08-16 PROCEDURE — 1160F RVW MEDS BY RX/DR IN RCRD: CPT | Mod: CPTII,S$GLB,, | Performed by: PHYSICIAN ASSISTANT

## 2022-08-16 PROCEDURE — 1160F PR REVIEW ALL MEDS BY PRESCRIBER/CLIN PHARMACIST DOCUMENTED: ICD-10-PCS | Mod: CPTII,S$GLB,, | Performed by: PHYSICIAN ASSISTANT

## 2022-08-16 PROCEDURE — 3008F PR BODY MASS INDEX (BMI) DOCUMENTED: ICD-10-PCS | Mod: CPTII,S$GLB,, | Performed by: PHYSICIAN ASSISTANT

## 2022-08-16 PROCEDURE — 99385 PREV VISIT NEW AGE 18-39: CPT | Mod: S$GLB,,, | Performed by: PHYSICIAN ASSISTANT

## 2022-08-16 PROCEDURE — 1159F MED LIST DOCD IN RCRD: CPT | Mod: CPTII,S$GLB,, | Performed by: PHYSICIAN ASSISTANT

## 2022-08-16 RX ORDER — NAPROXEN 500 MG/1
500 TABLET ORAL 2 TIMES DAILY
Qty: 60 TABLET | Refills: 5 | Status: SHIPPED | OUTPATIENT
Start: 2022-08-16 | End: 2022-08-16

## 2022-08-16 RX ORDER — NAPROXEN 500 MG/1
500 TABLET ORAL 2 TIMES DAILY PRN
Qty: 60 TABLET | Refills: 3 | Status: SHIPPED | OUTPATIENT
Start: 2022-08-16

## 2022-08-16 RX ORDER — BUSPIRONE HYDROCHLORIDE 5 MG/1
5 TABLET ORAL 2 TIMES DAILY
Qty: 60 TABLET | Refills: 5 | Status: SHIPPED | OUTPATIENT
Start: 2022-08-16 | End: 2023-02-22

## 2022-08-16 NOTE — PROGRESS NOTES
"  SUBJECTIVE:    Patient ID: Jose G Fischer is a 22 y.o. female.    Chief Complaint: Migraine (Happening every three to four day days, mostly in the evenings. Using icepacks and sometimes OTC meds. Since last year but increasing in frequency), Establish Care (/), and Insomnia (Taking melatonin, going on for about a month/ )    Pt is a 22 year old female who presents today as a new patient to establish care.  She does not have any previous PCP and would [facility] at [facility] when ill.  Her PMHx, SocHx, SugHx, and FamHx was reviewed and documented in her chart.  She currently works as a /.  She does not follow any particular diet and averages 2 meals/day with snacks in between.  As for exercise, she participates in 30 minutes home workouts 2x/week.  She has a minimal - smoking history average 3 cigarettes/day for 1.5 years, but quit in 2020. She rarely drinks alcohol - 1 weekend/month.  She denies any current recreational drug use.    At this time, she does not follow-up with any other medical specialties.    Today, she has multiple complaints - sleep issues, anxiety, and headaches. To begin with, she reports "trouble sleeping." She has been taking OTC melatonin nightly.  She typically goes to bed 10:00 p.m. and wakes up around 9:00 a.m.. When taking melatonin, she averages 8 hours of sleep/night. She denies any sleep disturbances.  Also, she reports increased anxiety.  Sources of her increased anxiety is secondary to social situations.  In the past, she reports taking Zoloft, but failed this medication due to side effects (increased agitation).  She denies any SI or HI.  Lastly, she reports frequent headaches. She is experiencing headaches 3-4x/week that typically occur 1 hour after work.  These headaches are described as band-like with an associated throbbing/stabbing pain.  The headaches on average last 2.5 hours, and then subside with NSAID therapy and ice packs.  She reports having " a prescription of diclofenac prescribed by an urgent care, which was efficacious.  She denies any auras, lightheadedness, dizziness, or syncopal episodes.    Has not followed up with GYN in > 2 years.    No visits with results within 6 Month(s) from this visit.   Latest known visit with results is:   Admission on 2021, Discharged on 2021   Component Date Value Ref Range Status    POC Preg Test, Ur 2021 Negative  Negative Final     Acceptable 2021 Yes   Final    SARS-CoV-2 RNA, Amplification, Qual 2021 Positive (A) Negative Final    Influenza A, Molecular 2021 Negative  Negative Final    Influenza B, Molecular 2021 Negative  Negative Final    Flu A & B Source 2021 Nasal swab   Final    Specimen UA 2021 Urine, Clean Catch   Final    Color, UA 2021 Yellow  Yellow, Straw, Digna Final    Appearance, UA 2021 Clear  Clear Final    pH, UA 2021 8.0  5.0 - 8.0 Final    Specific Gravity, UA 2021 1.010  1.005 - 1.030 Final    Protein, UA 2021 Negative  Negative Final    Glucose, UA 2021 Negative  Negative Final    Ketones, UA 2021 Negative  Negative Final    Bilirubin (UA) 2021 Negative  Negative Final    Occult Blood UA 2021 Negative  Negative Final    Nitrite, UA 2021 Negative  Negative Final    Urobilinogen, UA 2021 1.0  Negative EU/dL Final    Leukocytes, UA 2021 Negative  Negative Final       Past Medical History:   Diagnosis Date    ADHD (attention deficit hyperactivity disorder)     Concussion 2017 -     Hypertension      Social History     Socioeconomic History    Marital status: Single   Occupational History    Occupation: [employer]   Tobacco Use    Smoking status: Former Smoker     Types: Cigarettes     Quit date: 2020     Years since quittin.6    Smokeless tobacco: Never Used   Substance and Sexual Activity    Alcohol use: Yes     Comment: 1x/month  "   Drug use: No     History reviewed. No pertinent surgical history.  Family History   Problem Relation Age of Onset    Asthma Mother     No Known Problems Sister        Review of patient's allergies indicates:  No Known Allergies    Current Outpatient Medications:     busPIRone (BUSPAR) 5 MG Tab, Take 1 tablet (5 mg total) by mouth 2 (two) times daily., Disp: 60 tablet, Rfl: 5    estradiol-norethindrone (ACTIVELLA) 1-0.5 mg per tablet, Take 1 tablet by mouth once daily., Disp: , Rfl:     naproxen (NAPROSYN) 500 MG tablet, Take 1 tablet (500 mg total) by mouth 2 (two) times daily. (Patient not taking: Reported on 8/16/2022), Disp: 60 tablet, Rfl: 0    naproxen (NAPROSYN) 500 MG tablet, Take 1 tablet (500 mg total) by mouth 2 (two) times daily as needed (for Headaches)., Disp: 60 tablet, Rfl: 3    Review of Systems   Constitutional: Negative for activity change, appetite change, chills, fatigue and fever.        "Sleep issues."   HENT: Negative for congestion.    Respiratory: Negative for cough, shortness of breath and wheezing.    Cardiovascular: Negative for chest pain, palpitations and leg swelling.   Gastrointestinal: Negative for abdominal pain, constipation, diarrhea, nausea and vomiting.   Genitourinary: Negative for dysuria.   Musculoskeletal: Negative for arthralgias and myalgias.   Neurological: Positive for headaches. Negative for dizziness, syncope, weakness and light-headedness.   Psychiatric/Behavioral: Negative for agitation, behavioral problems, dysphoric mood, self-injury, sleep disturbance and suicidal ideas. The patient is nervous/anxious.           Objective:      Vitals:    08/16/22 0903   BP: 120/88   Pulse: 81   Resp: 19   Temp: 98.1 °F (36.7 °C)   SpO2: 99%   Weight: 68.9 kg (152 lb)   Height: 5' 5" (1.651 m)     Physical Exam  Vitals and nursing note reviewed.   Constitutional:       General: She is not in acute distress.     Appearance: Normal appearance. She is normal weight. She is " not ill-appearing, toxic-appearing or diaphoretic.   HENT:      Head: Normocephalic and atraumatic.      Right Ear: Tympanic membrane, ear canal and external ear normal. There is no impacted cerumen.      Left Ear: Tympanic membrane, ear canal and external ear normal. There is no impacted cerumen.      Nose: Nose normal. No rhinorrhea.      Mouth/Throat:      Mouth: Mucous membranes are moist.      Pharynx: Oropharynx is clear. No oropharyngeal exudate or posterior oropharyngeal erythema.   Eyes:      General: No scleral icterus.     Extraocular Movements: Extraocular movements intact.      Conjunctiva/sclera: Conjunctivae normal.   Cardiovascular:      Rate and Rhythm: Normal rate and regular rhythm.      Pulses: Normal pulses.      Heart sounds: Normal heart sounds. No murmur heard.    No friction rub.   Pulmonary:      Effort: Pulmonary effort is normal. No respiratory distress.      Breath sounds: Normal breath sounds. No wheezing, rhonchi or rales.   Abdominal:      General: There is no distension.      Palpations: Abdomen is soft.      Tenderness: There is no abdominal tenderness. There is no guarding or rebound.   Musculoskeletal:         General: No tenderness. Normal range of motion.      Cervical back: Normal range of motion and neck supple.      Right lower leg: No edema.      Left lower leg: No edema.      Comments: 5/5 strength against resistance noted in the bilateral lower extremities.  5/5 strength appreciated in the bilateral upper extremities.   Skin:     General: Skin is warm and dry.      Coloration: Skin is not jaundiced or pale.      Findings: No bruising, erythema or rash.      Comments: Right forearm tattoo noted   Neurological:      General: No focal deficit present.      Mental Status: She is alert. Mental status is at baseline.      Cranial Nerves: No cranial nerve deficit or dysarthria.      Sensory: Sensation is intact.      Motor: Motor function is intact. No weakness or abnormal muscle  tone.      Coordination: Coordination is intact. Coordination normal.      Gait: Gait is intact. Gait normal.   Psychiatric:         Attention and Perception: Attention normal.         Mood and Affect: Mood is anxious. Mood is not depressed.         Speech: Speech normal.         Behavior: Behavior normal. Behavior is cooperative.         Thought Content: Thought content normal. Thought content does not include homicidal or suicidal ideation. Thought content does not include homicidal or suicidal plan.         Cognition and Memory: Cognition normal.         Judgment: Judgment normal.           Assessment:       1. Establishing care with new doctor, encounter for    2. Anxiety    3. Acute intractable tension-type headache    4. Primary insomnia    5. Cervical cancer screening    6. Encounter for hepatitis C screening test for low risk patient    7. Encounter for long-term (current) use of other medications         Plan:       Establishing care with new doctor, encounter for  Annual lab work ordered today and to be completed, as patient is currently fasting.    Anxiety  Failed SSRI therapy in the past secondary to side effects of increased agitation.  Start BuSpar 5mg b.i.d.   -     busPIRone (BUSPAR) 5 MG Tab; Take 1 tablet (5 mg total) by mouth 2 (two) times daily.  Dispense: 60 tablet; Refill: 5    Acute intractable tension-type headache  Stop Diclofenac.  Start p.r.n. Naprosyn 500mg b.i.d. for headache relief.  -     naproxen (NAPROSYN) 500 MG tablet; Take 1 tablet (500 mg total) by mouth 2 (two) times daily as needed (for Headaches).  Dispense: 60 tablet; Refill: 3    Primary insomnia  Educated patient about good sleep hygiene.  Patient currently averaging approximately 8 hours of rest/night when using OTC Melatonin.  Will continue OTC Melatonin nightly at this time.  If Melatonin becomes less efficacious, will consider Remeron 15mg in the future.    Cervical cancer screening  Amb ref sent to Dr. Gaffney (GYN)  today to establish care and have her PAP completed.   -     Ambulatory referral/consult to Obstetrics / Gynecology; Future; Expected date: 08/16/2022    Encounter for hepatitis C screening test for low risk patient  -     Hepatitis C Antibody; Future; Expected date: 08/16/2022    Encounter for long-term (current) use of other medications  -     Comprehensive Metabolic Panel; Future; Expected date: 08/16/2022  -     Lipid Panel; Future; Expected date: 08/16/2022  -     CBC Auto Differential; Future; Expected date: 08/16/2022  -     TSH w/reflex to FT4; Future; Expected date: 08/16/2022  -     Urinalysis, Reflex to Urine Culture Urine, Clean Catch; Future; Expected date: 08/16/2022      Follow up in about 6 months (around 2/16/2023) for Anxiety.        8/16/2022 Jose David Jones PA-C

## 2022-08-18 LAB
ALBUMIN SERPL-MCNC: 4.6 G/DL (ref 3.6–5.1)
ALBUMIN/GLOB SERPL: 1.8 (CALC) (ref 1–2.5)
ALP SERPL-CCNC: 73 U/L (ref 31–125)
ALT SERPL-CCNC: 13 U/L (ref 6–29)
AST SERPL-CCNC: 14 U/L (ref 10–30)
BASOPHILS # BLD AUTO: 37 CELLS/UL (ref 0–200)
BASOPHILS NFR BLD AUTO: 0.5 %
BILIRUB SERPL-MCNC: 0.5 MG/DL (ref 0.2–1.2)
BUN SERPL-MCNC: 13 MG/DL (ref 7–25)
BUN/CREAT SERPL: NORMAL (CALC) (ref 6–22)
CALCIUM SERPL-MCNC: 9.8 MG/DL (ref 8.6–10.2)
CHLORIDE SERPL-SCNC: 105 MMOL/L (ref 98–110)
CHOLEST SERPL-MCNC: 201 MG/DL
CHOLEST/HDLC SERPL: 3.5 (CALC)
CO2 SERPL-SCNC: 26 MMOL/L (ref 20–32)
CREAT SERPL-MCNC: 0.54 MG/DL (ref 0.5–0.96)
EGFR: 133 ML/MIN/1.73M2
EOSINOPHIL # BLD AUTO: 89 CELLS/UL (ref 15–500)
EOSINOPHIL NFR BLD AUTO: 1.2 %
ERYTHROCYTE [DISTWIDTH] IN BLOOD BY AUTOMATED COUNT: 12.9 % (ref 11–15)
GLOBULIN SER CALC-MCNC: 2.6 G/DL (CALC) (ref 1.9–3.7)
GLUCOSE SERPL-MCNC: 84 MG/DL (ref 65–139)
HCT VFR BLD AUTO: 40.3 % (ref 35–45)
HCV AB S/CO SERPL IA: 0.05
HCV AB SERPL QL IA: NORMAL
HDLC SERPL-MCNC: 58 MG/DL
HGB BLD-MCNC: 13.4 G/DL (ref 11.7–15.5)
LDLC SERPL CALC-MCNC: 118 MG/DL (CALC)
LYMPHOCYTES # BLD AUTO: 1880 CELLS/UL (ref 850–3900)
LYMPHOCYTES NFR BLD AUTO: 25.4 %
MCH RBC QN AUTO: 28.6 PG (ref 27–33)
MCHC RBC AUTO-ENTMCNC: 33.3 G/DL (ref 32–36)
MCV RBC AUTO: 85.9 FL (ref 80–100)
MONOCYTES # BLD AUTO: 592 CELLS/UL (ref 200–950)
MONOCYTES NFR BLD AUTO: 8 %
NEUTROPHILS # BLD AUTO: 4803 CELLS/UL (ref 1500–7800)
NEUTROPHILS NFR BLD AUTO: 64.9 %
NONHDLC SERPL-MCNC: 143 MG/DL (CALC)
PLATELET # BLD AUTO: 275 THOUSAND/UL (ref 140–400)
PMV BLD REES-ECKER: 11.5 FL (ref 7.5–12.5)
POTASSIUM SERPL-SCNC: 4.3 MMOL/L (ref 3.5–5.3)
PROT SERPL-MCNC: 7.2 G/DL (ref 6.1–8.1)
RBC # BLD AUTO: 4.69 MILLION/UL (ref 3.8–5.1)
SODIUM SERPL-SCNC: 138 MMOL/L (ref 135–146)
TRIGL SERPL-MCNC: 135 MG/DL
TSH SERPL-ACNC: 0.99 MIU/L
WBC # BLD AUTO: 7.4 THOUSAND/UL (ref 3.8–10.8)

## 2022-08-22 ENCOUNTER — TELEPHONE (OUTPATIENT)
Dept: FAMILY MEDICINE | Facility: CLINIC | Age: 22
End: 2022-08-22

## 2022-08-22 NOTE — TELEPHONE ENCOUNTER
----- Message from ZAHRAA Olvera sent at 8/19/2022  1:44 PM CDT -----  Please contact patient and inform her that her lab work was reviewed.  Liver, kidney, and thyroid function are within normal limits.  No signs of anemia noted.  Cholesterol levels are controlled, though slightly elevated.  I recommend dietary modifications and increasing daily exercise.  Hepatitis-C screening is NEGATIVE.

## 2022-08-22 NOTE — TELEPHONE ENCOUNTER
Spoke with pt in regards to recent lab results. Verbalized per Jose David that her lab work was reviewed.  Liver, kidney, and thyroid function are within normal limits.  No signs of anemia noted.  Cholesterol levels are controlled, though slightly elevated.  I recommend dietary modifications and increasing daily exercise.  Hepatitis-C screening is NEGATIVE. Pt acknowledge understanding.

## 2023-02-22 ENCOUNTER — OFFICE VISIT (OUTPATIENT)
Dept: FAMILY MEDICINE | Facility: CLINIC | Age: 23
End: 2023-02-22
Payer: MEDICAID

## 2023-02-22 VITALS
HEART RATE: 80 BPM | BODY MASS INDEX: 24.43 KG/M2 | HEIGHT: 65 IN | OXYGEN SATURATION: 100 % | WEIGHT: 146.63 LBS | DIASTOLIC BLOOD PRESSURE: 74 MMHG | SYSTOLIC BLOOD PRESSURE: 112 MMHG

## 2023-02-22 DIAGNOSIS — F41.9 ANXIETY: ICD-10-CM

## 2023-02-22 DIAGNOSIS — Z00.00 ANNUAL PHYSICAL EXAM: Primary | ICD-10-CM

## 2023-02-22 PROCEDURE — 99213 OFFICE O/P EST LOW 20 MIN: CPT | Mod: S$GLB,,, | Performed by: NURSE PRACTITIONER

## 2023-02-22 PROCEDURE — 99213 PR OFFICE/OUTPT VISIT, EST, LEVL III, 20-29 MIN: ICD-10-PCS | Mod: S$GLB,,, | Performed by: NURSE PRACTITIONER

## 2023-02-22 PROCEDURE — 3074F SYST BP LT 130 MM HG: CPT | Mod: CPTII,S$GLB,, | Performed by: NURSE PRACTITIONER

## 2023-02-22 PROCEDURE — 1159F MED LIST DOCD IN RCRD: CPT | Mod: CPTII,S$GLB,, | Performed by: NURSE PRACTITIONER

## 2023-02-22 PROCEDURE — 3008F BODY MASS INDEX DOCD: CPT | Mod: CPTII,S$GLB,, | Performed by: NURSE PRACTITIONER

## 2023-02-22 PROCEDURE — 3008F PR BODY MASS INDEX (BMI) DOCUMENTED: ICD-10-PCS | Mod: CPTII,S$GLB,, | Performed by: NURSE PRACTITIONER

## 2023-02-22 PROCEDURE — 1160F RVW MEDS BY RX/DR IN RCRD: CPT | Mod: CPTII,S$GLB,, | Performed by: NURSE PRACTITIONER

## 2023-02-22 PROCEDURE — 1159F PR MEDICATION LIST DOCUMENTED IN MEDICAL RECORD: ICD-10-PCS | Mod: CPTII,S$GLB,, | Performed by: NURSE PRACTITIONER

## 2023-02-22 PROCEDURE — 3078F DIAST BP <80 MM HG: CPT | Mod: CPTII,S$GLB,, | Performed by: NURSE PRACTITIONER

## 2023-02-22 PROCEDURE — 1160F PR REVIEW ALL MEDS BY PRESCRIBER/CLIN PHARMACIST DOCUMENTED: ICD-10-PCS | Mod: CPTII,S$GLB,, | Performed by: NURSE PRACTITIONER

## 2023-02-22 PROCEDURE — 3078F PR MOST RECENT DIASTOLIC BLOOD PRESSURE < 80 MM HG: ICD-10-PCS | Mod: CPTII,S$GLB,, | Performed by: NURSE PRACTITIONER

## 2023-02-22 PROCEDURE — 3074F PR MOST RECENT SYSTOLIC BLOOD PRESSURE < 130 MM HG: ICD-10-PCS | Mod: CPTII,S$GLB,, | Performed by: NURSE PRACTITIONER

## 2023-02-22 NOTE — PROGRESS NOTES
SUBJECTIVE:    Patient ID: Jose G Fischer is a 22 y.o. female.    Chief Complaint: Follow-up (No bottles//Pt is here for a 6 month check and medication refills//Decline flu vaccine//MICHELLE )    Patient here for regular follow-up.  Patient new to me, last seen by Jose David [facility] as a new patient.  Started on BuSpar for anxiety complaints and NSAID for headaches at last visit. Pt reports only took buspar for 1 week and then stopped d/t sedation c/o's. She changed jobs in December and reports with new job her stress level has improved significantly so not feeling as anxious-declines need for anxiety medication. Reports her headaches have also improved quite a bit. Has taken naproxen maybe 5 times total since December    Utd with pap with Dr. Braun, having regular cycles      No visits with results within 6 Month(s) from this visit.   Latest known visit with results is:   Office Visit on 08/16/2022   Component Date Value Ref Range Status    Glucose 08/16/2022 84  65 - 139 mg/dL Final    BUN 08/16/2022 13  7 - 25 mg/dL Final    Creatinine 08/16/2022 0.54  0.50 - 0.96 mg/dL Final    eGFR 08/16/2022 133  > OR = 60 mL/min/1.73m2 Final    BUN/Creatinine Ratio 08/16/2022 NOT APPLICABLE  6 - 22 (calc) Final    Sodium 08/16/2022 138  135 - 146 mmol/L Final    Potassium 08/16/2022 4.3  3.5 - 5.3 mmol/L Final    Chloride 08/16/2022 105  98 - 110 mmol/L Final    CO2 08/16/2022 26  20 - 32 mmol/L Final    Calcium 08/16/2022 9.8  8.6 - 10.2 mg/dL Final    Total Protein 08/16/2022 7.2  6.1 - 8.1 g/dL Final    Albumin 08/16/2022 4.6  3.6 - 5.1 g/dL Final    Globulin, Total 08/16/2022 2.6  1.9 - 3.7 g/dL (calc) Final    Albumin/Globulin Ratio 08/16/2022 1.8  1.0 - 2.5 (calc) Final    Total Bilirubin 08/16/2022 0.5  0.2 - 1.2 mg/dL Final    Alkaline Phosphatase 08/16/2022 73  31 - 125 U/L Final    AST 08/16/2022 14  10 - 30 U/L Final    ALT 08/16/2022 13  6 - 29 U/L Final    Cholesterol 08/16/2022 201 (H)  <200 mg/dL Final    HDL  08/16/2022 58  > OR = 50 mg/dL Final    Triglycerides 08/16/2022 135  <150 mg/dL Final    LDL Cholesterol 08/16/2022 118 (H)  mg/dL (calc) Final    HDL/Cholesterol Ratio 08/16/2022 3.5  <5.0 (calc) Final    Non HDL Chol. (LDL+VLDL) 08/16/2022 143 (H)  <130 mg/dL (calc) Final    WBC 08/16/2022 7.4  3.8 - 10.8 Thousand/uL Final    RBC 08/16/2022 4.69  3.80 - 5.10 Million/uL Final    Hemoglobin 08/16/2022 13.4  11.7 - 15.5 g/dL Final    Hematocrit 08/16/2022 40.3  35.0 - 45.0 % Final    MCV 08/16/2022 85.9  80.0 - 100.0 fL Final    MCH 08/16/2022 28.6  27.0 - 33.0 pg Final    MCHC 08/16/2022 33.3  32.0 - 36.0 g/dL Final    RDW 08/16/2022 12.9  11.0 - 15.0 % Final    Platelets 08/16/2022 275  140 - 400 Thousand/uL Final    MPV 08/16/2022 11.5  7.5 - 12.5 fL Final    Neutrophils, Abs 08/16/2022 4,803  1,500 - 7,800 cells/uL Final    Lymph # 08/16/2022 1,880  850 - 3,900 cells/uL Final    Mono # 08/16/2022 592  200 - 950 cells/uL Final    Eos # 08/16/2022 89  15 - 500 cells/uL Final    Baso # 08/16/2022 37  0 - 200 cells/uL Final    Neutrophils Relative 08/16/2022 64.9  % Final    Lymph % 08/16/2022 25.4  % Final    Mono % 08/16/2022 8.0  % Final    Eosinophil % 08/16/2022 1.2  % Final    Basophil % 08/16/2022 0.5  % Final    TSH w/reflex to FT4 08/16/2022 0.99  mIU/L Final    Hepatitis C Ab 08/16/2022 NON-REACTIVE  NON-REACTIVE Final    Signal/Cutoff 08/16/2022 0.05  <1.00 Final       Past Medical History:   Diagnosis Date    ADHD (attention deficit hyperactivity disorder)     Concussion 2017 - 2020    Hypertension      History reviewed. No pertinent surgical history.  Family History   Problem Relation Age of Onset    Asthma Mother     No Known Problems Sister        The CVD Risk score (MAK'Agostino, et al., 2008) failed to calculate for the following reasons:    The 2008 CVD risk score is only valid for ages 30 to 74     Marital Status: Single  Alcohol History:  reports current alcohol use.  Tobacco History:  reports that  she quit smoking about 3 years ago. Her smoking use included cigarettes. She has never used smokeless tobacco.  Drug History:  reports no history of drug use.    The patient has no Health Maintenance topics of status Not Due  Immunization History   Administered Date(s) Administered    DTaP 2000, 2000, 2000, 11/27/2000, 06/02/2003    HPV Quadrivalent 02/03/2011, 05/10/2011, 11/23/2011, 01/01/2013    Hep B / HiB 2000    Hepatitis A, Pediatric/Adolescent, 2 Dose 03/23/2017    Hepatitis B 1999, 02/22/2000    HiB PRP-T 1999, 1999    Hib-HbOC 08/08/2000    IPV 1999, 1999, 05/09/2000, 06/02/2003    Influenza 11/04/2010, 11/23/2011, 11/19/2013    Influenza - Intranasal 11/12/2012    Influenza A (H1N1) 2009 Monovalent - IM 11/12/2009    MMR 05/09/2000, 06/02/2003    Meningococcal Conjugate (MCV4P) 07/08/2010, 06/29/2016    Pneumococcal Conjugate - 7 Valent 05/31/2001    Tdap 07/08/2010    Varicella 05/09/2000, 06/19/2007       Review of patient's allergies indicates:  No Known Allergies    Current Outpatient Medications:     naproxen (NAPROSYN) 500 MG tablet, Take 1 tablet (500 mg total) by mouth 2 (two) times daily. (Patient not taking: Reported on 8/16/2022), Disp: 60 tablet, Rfl: 0    naproxen (NAPROSYN) 500 MG tablet, Take 1 tablet (500 mg total) by mouth 2 (two) times daily as needed (for Headaches)., Disp: 60 tablet, Rfl: 3    Review of Systems   Constitutional:  Negative for appetite change, chills, fever and unexpected weight change.   HENT:  Negative for sore throat and trouble swallowing.    Respiratory:  Negative for cough, shortness of breath and wheezing.    Cardiovascular:  Negative for chest pain, palpitations and leg swelling.   Gastrointestinal:  Negative for abdominal pain, constipation, diarrhea, nausea and vomiting.   Genitourinary:  Negative for dysuria, frequency and hematuria.   Musculoskeletal:  Negative for back pain and gait problem.   Skin:   "Negative for rash.   Neurological:  Positive for headaches (only occasionally now). Negative for dizziness, syncope and numbness.   Psychiatric/Behavioral:  Negative for dysphoric mood. The patient is not nervous/anxious (much improved).         Objective:      Vitals:    02/22/23 1000   BP: 112/74   Pulse: 80   SpO2: 100%   Weight: 66.5 kg (146 lb 9.6 oz)   Height: 5' 5" (1.651 m)     Physical Exam  Vitals reviewed.   Constitutional:       General: She is not in acute distress.     Appearance: Normal appearance. She is well-developed and normal weight.   HENT:      Head: Normocephalic and atraumatic.      Right Ear: Tympanic membrane and ear canal normal.      Left Ear: Tympanic membrane and ear canal normal.   Neck:      Vascular: No carotid bruit.   Cardiovascular:      Rate and Rhythm: Normal rate and regular rhythm.      Heart sounds: No murmur heard.  Pulmonary:      Effort: Pulmonary effort is normal. No respiratory distress.      Breath sounds: Normal breath sounds. No wheezing or rales.   Abdominal:      General: There is no distension.      Palpations: Abdomen is soft.      Tenderness: There is no abdominal tenderness.   Musculoskeletal:      Cervical back: Neck supple.      Right lower leg: No edema.      Left lower leg: No edema.   Lymphadenopathy:      Cervical: No cervical adenopathy.   Skin:     General: Skin is warm and dry.      Findings: No rash.   Neurological:      General: No focal deficit present.      Mental Status: She is alert and oriented to person, place, and time.      Gait: Gait normal.   Psychiatric:         Mood and Affect: Mood normal.         Assessment:       1. Annual physical exam    2. Anxiety           Plan:       1. Annual physical exam   -overall doing well.  Discussed importance of healthy diet and regular exercise habits    2. Anxiety   -improved with job change, declines need for medication    Follow up in about 1 year (around 2/22/2024) for annual visit.          Counseled " on age and gender appropriate medical preventative services, including cancer screenings, immunizations, overall nutritional health, need for a consistent exercise regimen and an overall push towards maintaining a vigorous and active lifestyle.      2/22/2023 Denisha Duran NP

## 2023-02-23 ENCOUNTER — TELEPHONE (OUTPATIENT)
Dept: FAMILY MEDICINE | Facility: CLINIC | Age: 23
End: 2023-02-23

## 2023-02-23 NOTE — TELEPHONE ENCOUNTER
----- Message from Beth Ayon LPN sent at 2/22/2023  4:47 PM CST -----    ----- Message -----  From: Denisha Duran NP  Sent: 2/22/2023  10:34 AM CST  To: Denisha Duran Staff    Please request Pap smear from Dr. Elba Braun

## 2023-02-23 NOTE — LETTER
[address]  [place] [address]  Phone: [phone]   [phone]                        MD Selena Madiosn MD Chequita Williams, MD Matthew Bassett, ALEXANDREA Bal NP Jodi Powell, ALEXANDREA Jones PA-C      Date: 02/23/2023        Patient: Jose G Fischer  YOB: 2000      Please fax over pt's most recent pap smear.        Sincerely,     Joselyn Colon MA    Electronically Signed By: Denisha Duran NP

## 2023-11-08 ENCOUNTER — TELEPHONE (OUTPATIENT)
Dept: FAMILY MEDICINE | Facility: CLINIC | Age: 23
End: 2023-11-08

## 2023-11-08 DIAGNOSIS — Z13.1 DIABETES MELLITUS SCREENING: ICD-10-CM

## 2023-11-08 DIAGNOSIS — Z00.00 ANNUAL PHYSICAL EXAM: Primary | ICD-10-CM

## 2023-11-08 DIAGNOSIS — E16.2 HYPOGLYCEMIA: ICD-10-CM

## 2023-11-08 NOTE — TELEPHONE ENCOUNTER
Pt states she feels that her Blood sugar is dropping she would like to be evaluated. States sometime she will get dizzy, shaky, and sweaty.     Next appt is in about 1.5 week. Would you want labs?

## 2023-11-08 NOTE — TELEPHONE ENCOUNTER
Spoke with pt. Offered her appt in 1.5 week. Pt declined states she wanted afternoon appt. Offered 11/28/23. Pt agreeable appt scheduled. Pt aware of labs.

## 2023-11-08 NOTE — TELEPHONE ENCOUNTER
----- Message from Denisha Cuenca sent at 11/8/2023  1:00 PM CST -----  Pt needs to schedule an appt. Sooner than February.  She is having low blood sugar issues. Pt #[phone]

## 2023-11-28 ENCOUNTER — OFFICE VISIT (OUTPATIENT)
Dept: FAMILY MEDICINE | Facility: CLINIC | Age: 23
End: 2023-11-28
Payer: MEDICAID

## 2023-11-28 VITALS
HEIGHT: 65 IN | DIASTOLIC BLOOD PRESSURE: 70 MMHG | HEART RATE: 85 BPM | WEIGHT: 161 LBS | SYSTOLIC BLOOD PRESSURE: 128 MMHG | OXYGEN SATURATION: 100 % | BODY MASS INDEX: 26.82 KG/M2

## 2023-11-28 DIAGNOSIS — R51.9 CHRONIC NONINTRACTABLE HEADACHE, UNSPECIFIED HEADACHE TYPE: Primary | ICD-10-CM

## 2023-11-28 DIAGNOSIS — G89.29 CHRONIC NONINTRACTABLE HEADACHE, UNSPECIFIED HEADACHE TYPE: Primary | ICD-10-CM

## 2023-11-28 PROCEDURE — 3078F PR MOST RECENT DIASTOLIC BLOOD PRESSURE < 80 MM HG: ICD-10-PCS | Mod: CPTII,S$GLB,, | Performed by: NURSE PRACTITIONER

## 2023-11-28 PROCEDURE — 3008F BODY MASS INDEX DOCD: CPT | Mod: CPTII,S$GLB,, | Performed by: NURSE PRACTITIONER

## 2023-11-28 PROCEDURE — 3008F PR BODY MASS INDEX (BMI) DOCUMENTED: ICD-10-PCS | Mod: CPTII,S$GLB,, | Performed by: NURSE PRACTITIONER

## 2023-11-28 PROCEDURE — 3074F SYST BP LT 130 MM HG: CPT | Mod: CPTII,S$GLB,, | Performed by: NURSE PRACTITIONER

## 2023-11-28 PROCEDURE — 1159F PR MEDICATION LIST DOCUMENTED IN MEDICAL RECORD: ICD-10-PCS | Mod: CPTII,S$GLB,, | Performed by: NURSE PRACTITIONER

## 2023-11-28 PROCEDURE — 1159F MED LIST DOCD IN RCRD: CPT | Mod: CPTII,S$GLB,, | Performed by: NURSE PRACTITIONER

## 2023-11-28 PROCEDURE — 1160F PR REVIEW ALL MEDS BY PRESCRIBER/CLIN PHARMACIST DOCUMENTED: ICD-10-PCS | Mod: CPTII,S$GLB,, | Performed by: NURSE PRACTITIONER

## 2023-11-28 PROCEDURE — 99213 PR OFFICE/OUTPT VISIT, EST, LEVL III, 20-29 MIN: ICD-10-PCS | Mod: S$GLB,,, | Performed by: NURSE PRACTITIONER

## 2023-11-28 PROCEDURE — 99213 OFFICE O/P EST LOW 20 MIN: CPT | Mod: S$GLB,,, | Performed by: NURSE PRACTITIONER

## 2023-11-28 PROCEDURE — 3074F PR MOST RECENT SYSTOLIC BLOOD PRESSURE < 130 MM HG: ICD-10-PCS | Mod: CPTII,S$GLB,, | Performed by: NURSE PRACTITIONER

## 2023-11-28 PROCEDURE — 1160F RVW MEDS BY RX/DR IN RCRD: CPT | Mod: CPTII,S$GLB,, | Performed by: NURSE PRACTITIONER

## 2023-11-28 PROCEDURE — 3078F DIAST BP <80 MM HG: CPT | Mod: CPTII,S$GLB,, | Performed by: NURSE PRACTITIONER

## 2023-11-28 RX ORDER — NAPROXEN 500 MG/1
500 TABLET ORAL 2 TIMES DAILY PRN
Qty: 60 TABLET | Refills: 3 | Status: SHIPPED | OUTPATIENT
Start: 2023-11-28

## 2023-11-28 NOTE — PROGRESS NOTES
SUBJECTIVE:    Patient ID: Jose G Fischer is a 23 y.o. female.    Chief Complaint: Follow-up (No bottles//Pt is here for a check up and medication refills//decline flu vaccine//MICHELLE )    Patient here for regular follow-up- last seen 2/2023      Pt reports overall she's been doing okay since last visit.    Continues with headaches a couple times a week- reports headache either across forehead, left frontal and back of head, usually worse at end of day, +photophobia, no n/v, vision or speech change. Takes naproxen a couple times a week which resolves headache    Had the flu in October and reports took several weeks before she really felt better. Reports about 1 week after the flu had 2 episodes of dizziness and feeling very weak/shaky, no LOC/syncope. Denies any n/v or palpitations. Reports initially thought it could be hypoglycemia so called here for labs but then began to think it was probably fluid in her ears. Symptoms have resolved and haven't recurred in past 2 weeks    Utd with pap with Dr. Braun, having regular cycles        No visits with results within 6 Month(s) from this visit.   Latest known visit with results is:   Office Visit on 08/16/2022   Component Date Value Ref Range Status    Glucose 08/16/2022 84  65 - 139 mg/dL Final    BUN 08/16/2022 13  7 - 25 mg/dL Final    Creatinine 08/16/2022 0.54  0.50 - 0.96 mg/dL Final    eGFR 08/16/2022 133  > OR = 60 mL/min/1.73m2 Final    BUN/Creatinine Ratio 08/16/2022 NOT APPLICABLE  6 - 22 (calc) Final    Sodium 08/16/2022 138  135 - 146 mmol/L Final    Potassium 08/16/2022 4.3  3.5 - 5.3 mmol/L Final    Chloride 08/16/2022 105  98 - 110 mmol/L Final    CO2 08/16/2022 26  20 - 32 mmol/L Final    Calcium 08/16/2022 9.8  8.6 - 10.2 mg/dL Final    Total Protein 08/16/2022 7.2  6.1 - 8.1 g/dL Final    Albumin 08/16/2022 4.6  3.6 - 5.1 g/dL Final    Globulin, Total 08/16/2022 2.6  1.9 - 3.7 g/dL (calc) Final    Albumin/Globulin Ratio 08/16/2022 1.8  1.0 - 2.5  (calc) Final    Total Bilirubin 08/16/2022 0.5  0.2 - 1.2 mg/dL Final    Alkaline Phosphatase 08/16/2022 73  31 - 125 U/L Final    AST 08/16/2022 14  10 - 30 U/L Final    ALT 08/16/2022 13  6 - 29 U/L Final    Cholesterol 08/16/2022 201 (H)  <200 mg/dL Final    HDL 08/16/2022 58  > OR = 50 mg/dL Final    Triglycerides 08/16/2022 135  <150 mg/dL Final    LDL Cholesterol 08/16/2022 118 (H)  mg/dL (calc) Final    HDL/Cholesterol Ratio 08/16/2022 3.5  <5.0 (calc) Final    Non HDL Chol. (LDL+VLDL) 08/16/2022 143 (H)  <130 mg/dL (calc) Final    WBC 08/16/2022 7.4  3.8 - 10.8 Thousand/uL Final    RBC 08/16/2022 4.69  3.80 - 5.10 Million/uL Final    Hemoglobin 08/16/2022 13.4  11.7 - 15.5 g/dL Final    Hematocrit 08/16/2022 40.3  35.0 - 45.0 % Final    MCV 08/16/2022 85.9  80.0 - 100.0 fL Final    MCH 08/16/2022 28.6  27.0 - 33.0 pg Final    MCHC 08/16/2022 33.3  32.0 - 36.0 g/dL Final    RDW 08/16/2022 12.9  11.0 - 15.0 % Final    Platelets 08/16/2022 275  140 - 400 Thousand/uL Final    MPV 08/16/2022 11.5  7.5 - 12.5 fL Final    Neutrophils, Abs 08/16/2022 4,803  1,500 - 7,800 cells/uL Final    Lymph # 08/16/2022 1,880  850 - 3,900 cells/uL Final    Mono # 08/16/2022 592  200 - 950 cells/uL Final    Eos # 08/16/2022 89  15 - 500 cells/uL Final    Baso # 08/16/2022 37  0 - 200 cells/uL Final    Neutrophils Relative 08/16/2022 64.9  % Final    Lymph % 08/16/2022 25.4  % Final    Mono % 08/16/2022 8.0  % Final    Eosinophil % 08/16/2022 1.2  % Final    Basophil % 08/16/2022 0.5  % Final    TSH w/reflex to FT4 08/16/2022 0.99  mIU/L Final    Hepatitis C Ab 08/16/2022 NON-REACTIVE  NON-REACTIVE Final    Signal/Cutoff 08/16/2022 0.05  <1.00 Final       Past Medical History:   Diagnosis Date    ADHD (attention deficit hyperactivity disorder)     Concussion 2017 - 2020    Hypertension      History reviewed. No pertinent surgical history.  Family History   Problem Relation Age of Onset    Asthma Mother     No Known Problems Sister         Tests to Keep You Healthy    Cervical Cancer Screening: DUE      The CVD Risk score (MAK'Agostino, et al., 2008) failed to calculate for the following reasons:    The 2008 CVD risk score is only valid for ages 30 to 74     Marital Status: Single  Alcohol History:  reports current alcohol use.  Tobacco History:  reports that she quit smoking about 3 years ago. Her smoking use included cigarettes. She has never used smokeless tobacco.  Drug History:  reports no history of drug use.    The patient has no Health Maintenance topics of status Not Due  Immunization History   Administered Date(s) Administered    DTaP 1999, 1999, 1999, 11/27/2000, 06/02/2003    HPV Quadrivalent 02/03/2011, 05/10/2011, 11/23/2011, 01/01/2013    Hep B / HiB 1999    Hepatitis A, Pediatric/Adolescent, 2 Dose 03/23/2017    Hepatitis B 1999, 02/22/2000    HiB PRP-T 1999, 1999    Hib-HbOC 08/08/2000    IPV 1999, 1999, 05/09/2000, 06/02/2003    Influenza 11/04/2010, 11/23/2011, 11/19/2013    Influenza - Intranasal 11/12/2012    Influenza A (H1N1) 2009 Monovalent - IM 11/12/2009    MMR 05/09/2000, 06/02/2003    Meningococcal Conjugate (MCV4P) 07/08/2010, 06/29/2016    Pneumococcal Conjugate - 7 Valent 05/31/2001    Tdap 07/08/2010    Varicella 05/09/2000, 06/19/2007       Review of patient's allergies indicates:  No Known Allergies    Current Outpatient Medications:     naproxen (NAPROSYN) 500 MG tablet, Take 1 tablet (500 mg total) by mouth 2 (two) times daily as needed (for Headaches)., Disp: 60 tablet, Rfl: 3    naproxen (NAPROSYN) 500 MG tablet, Take 1 tablet (500 mg total) by mouth 2 (two) times daily as needed (headache). Take with food, Disp: 60 tablet, Rfl: 3    Review of Systems   Constitutional:  Negative for appetite change, chills, fever and unexpected weight change.   HENT:  Negative for ear discharge, ear pain, sore throat and trouble swallowing.    Eyes:  Positive for  "photophobia (with headache). Negative for visual disturbance.   Respiratory:  Negative for cough, shortness of breath and wheezing.    Cardiovascular:  Negative for chest pain, palpitations and leg swelling.   Gastrointestinal:  Negative for abdominal pain, constipation, diarrhea, nausea and vomiting.   Genitourinary:  Negative for dysuria, frequency and hematuria.   Musculoskeletal:  Negative for back pain and gait problem.   Skin:  Negative for rash.   Neurological:  Positive for dizziness (see HPI) and headaches (see HPI). Negative for syncope and numbness.   Psychiatric/Behavioral:  Negative for dysphoric mood. The patient is not nervous/anxious.           Objective:      Vitals:    11/28/23 1533   BP: 128/70   Pulse: 85   SpO2: 100%   Weight: 73 kg (161 lb)   Height: 5' 5" (1.651 m)     Physical Exam  Vitals reviewed.   Constitutional:       General: She is not in acute distress.     Appearance: Normal appearance. She is well-developed and normal weight.   HENT:      Head: Normocephalic and atraumatic.      Comments: Bilat Tms slightly dull, no erythema     Right Ear: Tympanic membrane and ear canal normal.      Left Ear: Tympanic membrane and ear canal normal.   Neck:      Vascular: No carotid bruit.   Cardiovascular:      Rate and Rhythm: Normal rate and regular rhythm.      Heart sounds: No murmur heard.  Pulmonary:      Effort: Pulmonary effort is normal. No respiratory distress.      Breath sounds: Normal breath sounds. No wheezing or rales.   Abdominal:      General: There is no distension.      Palpations: Abdomen is soft.      Tenderness: There is no abdominal tenderness.   Musculoskeletal:      Cervical back: Neck supple.      Right lower leg: No edema.      Left lower leg: No edema.   Lymphadenopathy:      Cervical: No cervical adenopathy.   Skin:     General: Skin is warm and dry.      Findings: No rash.   Neurological:      General: No focal deficit present.      Mental Status: She is alert and " oriented to person, place, and time.      Gait: Gait normal.   Psychiatric:         Mood and Affect: Mood normal.           Assessment:       1. Chronic nonintractable headache, unspecified headache type           Plan:       1. Chronic nonintractable headache, unspecified headache type  -overall stable with naproxen prn  -     naproxen (NAPROSYN) 500 MG tablet; Take 1 tablet (500 mg total) by mouth 2 (two) times daily as needed (headache). Take with food  Dispense: 60 tablet; Refill: 3      Follow up in about 1 year (around 11/28/2024) for annual visit.          Counseled on age and gender appropriate medical preventative services, including cancer screenings, immunizations, overall nutritional health, need for a consistent exercise regimen and an overall push towards maintaining a vigorous and active lifestyle.      11/28/2023 Denisha Duran NP

## 2024-02-07 ENCOUNTER — TELEPHONE (OUTPATIENT)
Dept: FAMILY MEDICINE | Facility: CLINIC | Age: 24
End: 2024-02-07
Payer: COMMERCIAL

## 2024-02-19 ENCOUNTER — TELEPHONE (OUTPATIENT)
Dept: FAMILY MEDICINE | Facility: CLINIC | Age: 24
End: 2024-02-19
Payer: COMMERCIAL

## 2024-02-19 NOTE — TELEPHONE ENCOUNTER
Spoke to pt and she has [employer] which is a medicare supplement plan. Let pt know we do take insurance . Pt verbalized understanding appointment scheduled

## 2024-02-19 NOTE — TELEPHONE ENCOUNTER
----- Message from Akila Pandey MA sent at 2/19/2024  9:59 AM CST -----  Patient is calling stating she believes she needs to cancel her upcoming appointment due to insurance change.     Pt: [phone]    -DN

## 2024-03-11 ENCOUNTER — OFFICE VISIT (OUTPATIENT)
Dept: FAMILY MEDICINE | Facility: CLINIC | Age: 24
End: 2024-03-11
Payer: COMMERCIAL

## 2024-03-11 VITALS
WEIGHT: 166 LBS | BODY MASS INDEX: 27.66 KG/M2 | SYSTOLIC BLOOD PRESSURE: 118 MMHG | DIASTOLIC BLOOD PRESSURE: 72 MMHG | HEART RATE: 80 BPM | HEIGHT: 65 IN | OXYGEN SATURATION: 99 %

## 2024-03-11 DIAGNOSIS — G43.109 MIGRAINE WITH AURA AND WITHOUT STATUS MIGRAINOSUS, NOT INTRACTABLE: Primary | ICD-10-CM

## 2024-03-11 PROCEDURE — 99213 OFFICE O/P EST LOW 20 MIN: CPT | Mod: S$GLB,,, | Performed by: NURSE PRACTITIONER

## 2024-03-11 RX ORDER — SUMATRIPTAN 50 MG/1
TABLET, FILM COATED ORAL
Qty: 18 TABLET | Refills: 2 | Status: SHIPPED | OUTPATIENT
Start: 2024-03-11

## 2024-03-11 NOTE — PROGRESS NOTES
SUBJECTIVE:    Patient ID: Jose G Fischer is a 23 y.o. female.    Chief Complaint: Follow-up (No bottles//Pt is here for a a check up//Discuss headaches. Pt stated that they have increasing gotten worse x started last month.//MICHELLE )    Patient here for headache f/u. Seen in November with c/o's of chronic migraine headaches since teenage years. Has managed with naproxen which has always helped until recently. Reports has been headache about 2 a week and no longer relieved with NSAID. Reports floater like vision changes prior to onset of headaches- headaches may be frontal or temporal. No fever/chills or neck pain. Reports photophobia though denies nausea/vomiting. Doesn't have any relation of headaches to cycles    Utd with pap with Dr. Braun, having regular cycles        No visits with results within 6 Month(s) from this visit.   Latest known visit with results is:   Office Visit on 08/16/2022   Component Date Value Ref Range Status    Glucose 08/16/2022 84  65 - 139 mg/dL Final    BUN 08/16/2022 13  7 - 25 mg/dL Final    Creatinine 08/16/2022 0.54  0.50 - 0.96 mg/dL Final    eGFR 08/16/2022 133  > OR = 60 mL/min/1.73m2 Final    BUN/Creatinine Ratio 08/16/2022 NOT APPLICABLE  6 - 22 (calc) Final    Sodium 08/16/2022 138  135 - 146 mmol/L Final    Potassium 08/16/2022 4.3  3.5 - 5.3 mmol/L Final    Chloride 08/16/2022 105  98 - 110 mmol/L Final    CO2 08/16/2022 26  20 - 32 mmol/L Final    Calcium 08/16/2022 9.8  8.6 - 10.2 mg/dL Final    Total Protein 08/16/2022 7.2  6.1 - 8.1 g/dL Final    Albumin 08/16/2022 4.6  3.6 - 5.1 g/dL Final    Globulin, Total 08/16/2022 2.6  1.9 - 3.7 g/dL (calc) Final    Albumin/Globulin Ratio 08/16/2022 1.8  1.0 - 2.5 (calc) Final    Total Bilirubin 08/16/2022 0.5  0.2 - 1.2 mg/dL Final    Alkaline Phosphatase 08/16/2022 73  31 - 125 U/L Final    AST 08/16/2022 14  10 - 30 U/L Final    ALT 08/16/2022 13  6 - 29 U/L Final    Cholesterol 08/16/2022 201 (H)  <200 mg/dL Final    HDL  08/16/2022 58  > OR = 50 mg/dL Final    Triglycerides 08/16/2022 135  <150 mg/dL Final    LDL Cholesterol 08/16/2022 118 (H)  mg/dL (calc) Final    HDL/Cholesterol Ratio 08/16/2022 3.5  <5.0 (calc) Final    Non HDL Chol. (LDL+VLDL) 08/16/2022 143 (H)  <130 mg/dL (calc) Final    WBC 08/16/2022 7.4  3.8 - 10.8 Thousand/uL Final    RBC 08/16/2022 4.69  3.80 - 5.10 Million/uL Final    Hemoglobin 08/16/2022 13.4  11.7 - 15.5 g/dL Final    Hematocrit 08/16/2022 40.3  35.0 - 45.0 % Final    MCV 08/16/2022 85.9  80.0 - 100.0 fL Final    MCH 08/16/2022 28.6  27.0 - 33.0 pg Final    MCHC 08/16/2022 33.3  32.0 - 36.0 g/dL Final    RDW 08/16/2022 12.9  11.0 - 15.0 % Final    Platelets 08/16/2022 275  140 - 400 Thousand/uL Final    MPV 08/16/2022 11.5  7.5 - 12.5 fL Final    Neutrophils, Abs 08/16/2022 4,803  1,500 - 7,800 cells/uL Final    Lymph # 08/16/2022 1,880  850 - 3,900 cells/uL Final    Mono # 08/16/2022 592  200 - 950 cells/uL Final    Eos # 08/16/2022 89  15 - 500 cells/uL Final    Baso # 08/16/2022 37  0 - 200 cells/uL Final    Neutrophils Relative 08/16/2022 64.9  % Final    Lymph % 08/16/2022 25.4  % Final    Mono % 08/16/2022 8.0  % Final    Eosinophil % 08/16/2022 1.2  % Final    Basophil % 08/16/2022 0.5  % Final    TSH w/reflex to FT4 08/16/2022 0.99  mIU/L Final    Hepatitis C Ab 08/16/2022 NON-REACTIVE  NON-REACTIVE Final    Signal/Cutoff 08/16/2022 0.05  <1.00 Final       Past Medical History:   Diagnosis Date    ADHD (attention deficit hyperactivity disorder)     Concussion 2017 - 2020    Hypertension      History reviewed. No pertinent surgical history.  Family History   Problem Relation Age of Onset    Asthma Mother     No Known Problems Sister        Tests to Keep You Healthy    Cervical Cancer Screening: DUE      The CVD Risk score (MAK'Agoino, et al., 2008) failed to calculate for the following reasons:    The 2008 CVD risk score is only valid for ages 30 to 74     Marital Status: Single  Alcohol  History:  reports current alcohol use.  Tobacco History:  reports that she quit smoking about 4 years ago. Her smoking use included cigarettes. She has never used smokeless tobacco.  Drug History:  reports no history of drug use.    The patient has no Health Maintenance topics of status Not Due  Immunization History   Administered Date(s) Administered    DTaP 1999, 1999, 1999, 11/27/2000, 06/02/2003    HPV Quadrivalent 02/03/2011, 05/10/2011, 11/23/2011, 01/01/2013    Hep B / HiB 1999    Hepatitis A, Pediatric/Adolescent, 2 Dose 03/23/2017    Hepatitis B 1999, 02/22/2000    HiB PRP-T 1999, 1999    Hib-HbOC 08/08/2000    IPV 1999, 1999, 05/09/2000, 06/02/2003    Influenza 11/04/2010, 11/23/2011, 11/19/2013    Influenza - Intranasal 11/12/2012    Influenza A (H1N1) 2009 Monovalent - IM 11/12/2009    MMR 05/09/2000, 06/02/2003    Meningococcal Conjugate (MCV4P) 07/08/2010, 06/29/2016    Pneumococcal Conjugate - 7 Valent 05/31/2001    Tdap 07/08/2010    Varicella 05/09/2000, 06/19/2007       Review of patient's allergies indicates:  No Known Allergies    Current Outpatient Medications:     naproxen (NAPROSYN) 500 MG tablet, Take 1 tablet (500 mg total) by mouth 2 (two) times daily as needed (for Headaches)., Disp: 60 tablet, Rfl: 3    naproxen (NAPROSYN) 500 MG tablet, Take 1 tablet (500 mg total) by mouth 2 (two) times daily as needed (headache). Take with food, Disp: 60 tablet, Rfl: 3    sumatriptan (IMITREX) 50 MG tablet, Take 1 tablet at onset of headache- may repeat in 2 hours if needed. Max dose 2tabs/24 hours, Disp: 18 tablet, Rfl: 2    Review of Systems   Constitutional:  Negative for appetite change, chills, fever and unexpected weight change.   HENT:  Negative for ear discharge, ear pain, sore throat and trouble swallowing.    Eyes:  Positive for photophobia (with headache) and visual disturbance (with headache).   Respiratory:  Negative for cough,  "shortness of breath and wheezing.    Cardiovascular:  Negative for chest pain, palpitations and leg swelling.   Gastrointestinal:  Negative for abdominal pain, constipation, diarrhea, nausea and vomiting.   Genitourinary:  Negative for dysuria, frequency and hematuria.   Musculoskeletal:  Negative for back pain and gait problem.   Skin:  Negative for rash.   Neurological:  Positive for headaches (see HPI). Negative for dizziness, syncope and numbness.   Psychiatric/Behavioral:  Negative for dysphoric mood. The patient is not nervous/anxious.           Objective:      Vitals:    03/11/24 1552   BP: 118/72   Pulse: 80   SpO2: 99%   Weight: 75.3 kg (166 lb)   Height: 5' 5" (1.651 m)     Physical Exam  Vitals reviewed.   Constitutional:       General: She is not in acute distress.     Appearance: Normal appearance. She is well-developed and normal weight.   HENT:      Head: Normocephalic and atraumatic.      Comments: Bilat Tms slightly dull, no erythema     Right Ear: Tympanic membrane and ear canal normal.      Left Ear: Tympanic membrane and ear canal normal.   Eyes:      Pupils: Pupils are equal, round, and reactive to light.   Neck:      Vascular: No carotid bruit.   Cardiovascular:      Rate and Rhythm: Normal rate and regular rhythm.      Heart sounds: No murmur heard.  Pulmonary:      Effort: Pulmonary effort is normal. No respiratory distress.      Breath sounds: Normal breath sounds. No wheezing or rales.   Abdominal:      General: There is no distension.      Palpations: Abdomen is soft.      Tenderness: There is no abdominal tenderness.   Musculoskeletal:      Cervical back: Neck supple.      Right lower leg: No edema.      Left lower leg: No edema.   Lymphadenopathy:      Cervical: No cervical adenopathy.   Skin:     General: Skin is warm and dry.      Findings: No rash.   Neurological:      General: No focal deficit present.      Mental Status: She is alert and oriented to person, place, and time.      " Cranial Nerves: No cranial nerve deficit.      Gait: Gait normal.   Psychiatric:         Mood and Affect: Mood normal.           Assessment:       1. Migraine with aura and without status migrainosus, not intractable           Plan:       1. Migraine with aura and without status migrainosus, not intractable  -pt having headaches twice/week, has only used NSAIDs for headache up to this point however no longer effective. Will add triptan and discussed adding propranolol if continues with frequent headaches. We did discuss referral to headache specialist if she doesn't respond to first round of options  -     sumatriptan (IMITREX) 50 MG tablet; Take 1 tablet at onset of headache- may repeat in 2 hours if needed. Max dose 2tabs/24 hours  Dispense: 18 tablet; Refill: 2      Follow up in about 3 months (around 6/11/2024) for headache.          Counseled on age and gender appropriate medical preventative services, including cancer screenings, immunizations, overall nutritional health, need for a consistent exercise regimen and an overall push towards maintaining a vigorous and active lifestyle.      3/11/2024 Denisha Duran NP

## 2024-05-16 LAB
PAP RECOMMENDATION EXT: NORMAL
PAP SMEAR: NORMAL

## 2024-06-17 ENCOUNTER — TELEPHONE (OUTPATIENT)
Dept: FAMILY MEDICINE | Facility: CLINIC | Age: 24
End: 2024-06-17
Payer: COMMERCIAL

## 2024-07-01 ENCOUNTER — OFFICE VISIT (OUTPATIENT)
Dept: FAMILY MEDICINE | Facility: CLINIC | Age: 24
End: 2024-07-01
Payer: COMMERCIAL

## 2024-07-01 ENCOUNTER — TELEPHONE (OUTPATIENT)
Dept: FAMILY MEDICINE | Facility: CLINIC | Age: 24
End: 2024-07-01

## 2024-07-01 VITALS
DIASTOLIC BLOOD PRESSURE: 70 MMHG | SYSTOLIC BLOOD PRESSURE: 122 MMHG | WEIGHT: 168 LBS | TEMPERATURE: 98 F | HEIGHT: 65 IN | BODY MASS INDEX: 27.99 KG/M2 | HEART RATE: 97 BPM | OXYGEN SATURATION: 98 %

## 2024-07-01 DIAGNOSIS — G43.109 MIGRAINE WITH AURA AND WITHOUT STATUS MIGRAINOSUS, NOT INTRACTABLE: ICD-10-CM

## 2024-07-01 DIAGNOSIS — J06.9 UPPER RESPIRATORY TRACT INFECTION, UNSPECIFIED TYPE: Primary | ICD-10-CM

## 2024-07-01 LAB
CTP QC/QA: YES
CTP QC/QA: YES
POC MOLECULAR INFLUENZA A AGN: NEGATIVE
POC MOLECULAR INFLUENZA B AGN: NEGATIVE
SARS-COV-2 RDRP RESP QL NAA+PROBE: NEGATIVE

## 2024-07-01 PROCEDURE — 87635 SARS-COV-2 COVID-19 AMP PRB: CPT | Mod: QW,S$GLB,, | Performed by: NURSE PRACTITIONER

## 2024-07-01 PROCEDURE — 87502 INFLUENZA DNA AMP PROBE: CPT | Mod: QW,,, | Performed by: NURSE PRACTITIONER

## 2024-07-01 PROCEDURE — 99213 OFFICE O/P EST LOW 20 MIN: CPT | Mod: S$GLB,,, | Performed by: NURSE PRACTITIONER

## 2024-07-01 RX ORDER — SUMATRIPTAN 50 MG/1
TABLET, FILM COATED ORAL
Qty: 18 TABLET | Refills: 3 | Status: SHIPPED | OUTPATIENT
Start: 2024-07-01

## 2024-07-01 NOTE — PROGRESS NOTES
SUBJECTIVE:    Patient ID: Jose G Fischer is a 24 y.o. female.    Chief Complaint: Sore Throat (No bottles//Pt c/o sore throat, sinus congestion, possible fever/chills, productive coughing and headache x 2 days//MICHELLE )    Pt here for 3 month f/u headache. Pt reports started 2 days ago with body aches, sinus congestion, sore throat, cough and headache. Reports boyfriend was sick prior to her getting ill. Has taken otc nyquil and naprosyn which helps some    Pt reports before this recent illness she's been doing better from headache standpoint. Was prescribed sumatriptan and reports has been taking 1/2 tablet about once a week which is helping her headaches.            Office Visit on 07/01/2024   Component Date Value Ref Range Status    POC Rapid COVID 07/01/2024 Negative  Negative Final     Acceptable 07/01/2024 Yes   Final    POC Molecular Influenza A Ag 07/01/2024 Negative  Negative Final    POC Molecular Influenza B Ag 07/01/2024 Negative  Negative Final     Acceptable 07/01/2024 Yes   Final       Past Medical History:   Diagnosis Date    ADHD (attention deficit hyperactivity disorder)     Concussion 2017 - 2020    Hypertension      History reviewed. No pertinent surgical history.  Family History   Problem Relation Name Age of Onset    Asthma Mother      No Known Problems Sister         Tests to Keep You Healthy    Cervical Cancer Screening: DUE      The CVD Risk score (D'Agostino, et al., 2008) failed to calculate for the following reasons:    The 2008 CVD risk score is only valid for ages 30 to 74     Marital Status: Single  Alcohol History:  reports current alcohol use.  Tobacco History:  reports that she quit smoking about 4 years ago. Her smoking use included cigarettes. She has never used smokeless tobacco.  Drug History:  reports no history of drug use.    Health Maintenance Topics with due status: Not Due       Topic Last Completion Date    Influenza Vaccine 11/19/2013      Immunization History   Administered Date(s) Administered    DTaP 1999, 1999, 1999, 11/27/2000, 06/02/2003    HPV Quadrivalent 02/03/2011, 05/10/2011, 11/23/2011, 01/01/2013    Hep B / HiB 1999    Hepatitis A, Pediatric/Adolescent, 2 Dose 03/23/2017    Hepatitis B 1999, 02/22/2000    HiB PRP-T 1999, 1999    Hib-HbOC 08/08/2000    IPV 1999, 1999, 05/09/2000, 06/02/2003    Influenza 11/04/2010, 11/23/2011, 11/19/2013    Influenza - Intranasal 11/12/2012    Influenza A (H1N1) 2009 Monovalent - IM 11/12/2009    MMR 05/09/2000, 06/02/2003    Meningococcal Conjugate (MCV4P) 07/08/2010, 06/29/2016    Pneumococcal Conjugate - 7 Valent 05/31/2001    Tdap 07/08/2010    Varicella 05/09/2000, 06/19/2007       Review of patient's allergies indicates:  No Known Allergies    Current Outpatient Medications:     naproxen (NAPROSYN) 500 MG tablet, Take 1 tablet (500 mg total) by mouth 2 (two) times daily as needed (for Headaches)., Disp: 60 tablet, Rfl: 3    naproxen (NAPROSYN) 500 MG tablet, Take 1 tablet (500 mg total) by mouth 2 (two) times daily as needed (headache). Take with food, Disp: 60 tablet, Rfl: 3    sumatriptan (IMITREX) 50 MG tablet, Take 1 tablet at onset of headache- may repeat in 2 hours if needed. Max dose 2tabs/24 hours, Disp: 18 tablet, Rfl: 3    Review of Systems   Constitutional:  Negative for appetite change, chills, fever and unexpected weight change.   HENT:  Positive for congestion, postnasal drip and sore throat. Negative for ear discharge, ear pain and trouble swallowing.    Eyes:  Negative for visual disturbance.   Respiratory:  Positive for cough. Negative for shortness of breath and wheezing.    Cardiovascular:  Negative for chest pain, palpitations and leg swelling.   Gastrointestinal:  Negative for abdominal pain, constipation, diarrhea, nausea and vomiting.   Genitourinary:  Negative for dysuria, frequency and hematuria.   Musculoskeletal:   "Positive for myalgias. Negative for back pain and gait problem.   Skin:  Negative for rash.   Neurological:  Positive for headaches (improved since last visit). Negative for dizziness, syncope and numbness.   Psychiatric/Behavioral:  Negative for dysphoric mood. The patient is not nervous/anxious.           Objective:      Vitals:    07/01/24 1441   BP: 122/70   Pulse: 97   Temp: 97.9 °F (36.6 °C)   SpO2: 98%   Weight: 76.2 kg (168 lb)   Height: 5' 5" (1.651 m)     Physical Exam  Vitals reviewed.   Constitutional:       General: She is not in acute distress.     Appearance: Normal appearance. She is well-developed and normal weight.   HENT:      Head: Normocephalic and atraumatic.      Comments: Bilat Tms slightly dull, no erythema     Right Ear: Tympanic membrane and ear canal normal.      Left Ear: Tympanic membrane and ear canal normal.      Mouth/Throat:      Mouth: Mucous membranes are moist.      Pharynx: No oropharyngeal exudate or posterior oropharyngeal erythema.   Neck:      Vascular: No carotid bruit.   Cardiovascular:      Rate and Rhythm: Normal rate and regular rhythm.      Heart sounds: No murmur heard.  Pulmonary:      Effort: Pulmonary effort is normal. No respiratory distress.      Breath sounds: Normal breath sounds. No wheezing or rales.   Abdominal:      Palpations: Abdomen is soft.      Tenderness: There is no abdominal tenderness.   Musculoskeletal:      Cervical back: Neck supple.      Right lower leg: No edema.      Left lower leg: No edema.   Lymphadenopathy:      Cervical: No cervical adenopathy.   Skin:     General: Skin is warm and dry.      Findings: No rash.   Neurological:      General: No focal deficit present.      Mental Status: She is alert and oriented to person, place, and time.      Cranial Nerves: No cranial nerve deficit.      Gait: Gait normal.   Psychiatric:         Mood and Affect: Mood normal.           Assessment:       1. Upper respiratory tract infection, unspecified " type    2. Migraine with aura and without status migrainosus, not intractable           Plan:       1. Upper respiratory tract infection, unspecified type  -flu/COVID negative today. Discussed supportive trt with otc meds for viral URI. Cautioned to call for any worsening in symptoms  -     POCT COVID-19 Rapid Screening  -     POCT Influenza A/B Molecular    2. Migraine with aura and without status migrainosus, not intractable  -pt reports improved since last visit. Taking sumatriptan about once a week with relief  -     sumatriptan (IMITREX) 50 MG tablet; Take 1 tablet at onset of headache- may repeat in 2 hours if needed. Max dose 2tabs/24 hours  Dispense: 18 tablet; Refill: 3      Follow up in about 6 months (around 1/1/2025) for headache.          Counseled on age and gender appropriate medical preventative services, including cancer screenings, immunizations, overall nutritional health, need for a consistent exercise regimen and an overall push towards maintaining a vigorous and active lifestyle.      7/1/2024 Denisha Duran NP

## 2024-07-01 NOTE — TELEPHONE ENCOUNTER
----- Message from Lucinda Stanley sent at 7/1/2024  9:50 AM CDT -----  Pt would like to know if there is an earlier appointment slot available for today.   [phone]

## 2024-07-02 ENCOUNTER — PATIENT OUTREACH (OUTPATIENT)
Dept: ADMINISTRATIVE | Facility: HOSPITAL | Age: 24
End: 2024-07-02
Payer: COMMERCIAL

## 2024-07-02 NOTE — PROGRESS NOTES
Population Health Chart Review & Patient Outreach Details      Additional [facility] Health Notes:               Updates Requested / Reviewed:      Updated Care Coordination Note, External Sources: [facility] and [facility], and Immunizations Reconciliation Completed or Queried: [place]         Health Maintenance Topics Overdue:      VB Score: 0     Patient is not due for any topics at this time.                       Health Maintenance Topic(s) Outreach Outcomes & Actions Taken:    Cervical Cancer Screening - Outreach Outcomes & Actions Taken  : External Records Uploaded & Care Team Updated if Applicable

## 2024-10-31 ENCOUNTER — TELEPHONE (OUTPATIENT)
Dept: FAMILY MEDICINE | Facility: CLINIC | Age: 24
End: 2024-10-31
Payer: COMMERCIAL

## 2024-11-19 ENCOUNTER — TELEPHONE (OUTPATIENT)
Dept: FAMILY MEDICINE | Facility: CLINIC | Age: 24
End: 2024-11-19
Payer: COMMERCIAL

## 2024-11-19 DIAGNOSIS — Z00.00 ANNUAL PHYSICAL EXAM: Primary | ICD-10-CM

## 2024-11-19 NOTE — TELEPHONE ENCOUNTER
Left message on voice mail, verbalizing that we have placed labs for the pt to have done before pt's upcoming appointment on 12/03/2024. Lab order are with [facility]. So you can come into the office for your labs, no appointment necessary. Just make sure you are fasting for your labs. Verbalized that if they have any question or concerns, please give our office a call.

## 2024-12-03 ENCOUNTER — OFFICE VISIT (OUTPATIENT)
Dept: FAMILY MEDICINE | Facility: CLINIC | Age: 24
End: 2024-12-03
Payer: COMMERCIAL

## 2024-12-03 VITALS
OXYGEN SATURATION: 100 % | SYSTOLIC BLOOD PRESSURE: 110 MMHG | WEIGHT: 166 LBS | BODY MASS INDEX: 27.66 KG/M2 | HEART RATE: 82 BPM | DIASTOLIC BLOOD PRESSURE: 70 MMHG | HEIGHT: 65 IN

## 2024-12-03 DIAGNOSIS — T75.3XXA CAR SICKNESS, INITIAL ENCOUNTER: ICD-10-CM

## 2024-12-03 DIAGNOSIS — G43.109 MIGRAINE WITH AURA AND WITHOUT STATUS MIGRAINOSUS, NOT INTRACTABLE: ICD-10-CM

## 2024-12-03 DIAGNOSIS — Z00.00 ANNUAL PHYSICAL EXAM: ICD-10-CM

## 2024-12-03 PROCEDURE — 99395 PREV VISIT EST AGE 18-39: CPT | Mod: S$GLB,,, | Performed by: NURSE PRACTITIONER

## 2024-12-03 RX ORDER — ONDANSETRON 4 MG/1
4 TABLET, ORALLY DISINTEGRATING ORAL EVERY 6 HOURS PRN
Qty: 20 TABLET | Refills: 2 | Status: SHIPPED | OUTPATIENT
Start: 2024-12-03

## 2024-12-03 NOTE — PROGRESS NOTES
SUBJECTIVE:    Patient ID: Jose G Fischer is a 24 y.o. female.    Chief Complaint: Annual Exam (No bottles//Pt is here for a her annual exam and medication refills//Pt decline flu vaccine//MICHELLE )    Patient here for annual physical, f/u headaches    Pt reports overall she's been doing well since last visit. Currently engaged and planning wedding for next year. Admits she's gained about 20lbs over the past year but wants to try to work on weight loss before her wedding    Reports continues with migraine headaches, occurs a little over once a month- was prescribed sumatriptan at last visit and she reports that is helping a lot- headache usually resolves within 1-2 hours after taking med.    C/o's of car sickness if she's not driving- c/o's of nausea and occas dizziness. Denies any palpitations or vomiting.    Utd with pap with Dr. Braun, having regular cycles        Telephone on 11/19/2024   Component Date Value Ref Range Status    WBC 11/29/2024 5.4  3.8 - 10.8 Thousand/uL Final    RBC 11/29/2024 4.67  3.80 - 5.10 Million/uL Final    Hemoglobin 11/29/2024 13.3  11.7 - 15.5 g/dL Final    Hematocrit 11/29/2024 40.6  35.0 - 45.0 % Final    MCV 11/29/2024 86.9  80.0 - 100.0 fL Final    MCH 11/29/2024 28.5  27.0 - 33.0 pg Final    MCHC 11/29/2024 32.8  32.0 - 36.0 g/dL Final    RDW 11/29/2024 12.9  11.0 - 15.0 % Final    Platelets 11/29/2024 299  140 - 400 Thousand/uL Final    MPV 11/29/2024 11.0  7.5 - 12.5 fL Final    Neutrophils, Abs 11/29/2024 2,851  1,500 - 7,800 cells/uL Final    Lymph # 11/29/2024 1,955  850 - 3,900 cells/uL Final    Mono # 11/29/2024 443  200 - 950 cells/uL Final    Eos # 11/29/2024 103  15 - 500 cells/uL Final    Baso # 11/29/2024 49  0 - 200 cells/uL Final    Neutrophils Relative 11/29/2024 52.8  % Final    Lymph % 11/29/2024 36.2  % Final    Mono % 11/29/2024 8.2  % Final    Eosinophil % 11/29/2024 1.9  % Final    Basophil % 11/29/2024 0.9  % Final    Glucose 11/29/2024 90  65 - 99 mg/dL  Final    BUN 11/29/2024 10  7 - 25 mg/dL Final    Creatinine 11/29/2024 0.64  0.50 - 0.96 mg/dL Final    eGFR 11/29/2024 126  > OR = 60 mL/min/1.73m2 Final    BUN/Creatinine Ratio 11/29/2024 SEE NOTE:  6 - 22 (calc) Final    Sodium 11/29/2024 139  135 - 146 mmol/L Final    Potassium 11/29/2024 4.6  3.5 - 5.3 mmol/L Final    Chloride 11/29/2024 105  98 - 110 mmol/L Final    CO2 11/29/2024 27  20 - 32 mmol/L Final    Calcium 11/29/2024 9.6  8.6 - 10.2 mg/dL Final    Total Protein 11/29/2024 7.0  6.1 - 8.1 g/dL Final    Albumin 11/29/2024 4.3  3.6 - 5.1 g/dL Final    Globulin, Total 11/29/2024 2.7  1.9 - 3.7 g/dL (calc) Final    Albumin/Globulin Ratio 11/29/2024 1.6  1.0 - 2.5 (calc) Final    Total Bilirubin 11/29/2024 0.4  0.2 - 1.2 mg/dL Final    Alkaline Phosphatase 11/29/2024 76  31 - 125 U/L Final    AST 11/29/2024 20  10 - 30 U/L Final    ALT 11/29/2024 25  6 - 29 U/L Final    Cholesterol 11/29/2024 219 (H)  <200 mg/dL Final    HDL 11/29/2024 43 (L)  > OR = 50 mg/dL Final    Triglycerides 11/29/2024 219 (H)  <150 mg/dL Final    LDL Cholesterol 11/29/2024 140 (H)  mg/dL (calc) Final    HDL/Cholesterol Ratio 11/29/2024 5.1 (H)  <5.0 (calc) Final    Non HDL Chol. (LDL+VLDL) 11/29/2024 176 (H)  <130 mg/dL (calc) Final    TSH w/reflex to FT4 11/29/2024 1.94  mIU/L Final   Patient Outreach on 07/02/2024   Component Date Value Ref Range Status    PAP Recommendation External 05/16/2024 Pap in 3 years   Final    Pap 05/16/2024 Negative for intraephithelial lesion or malignancy  Negative for intraephithelial lesion or malignancy, Other Final   Office Visit on 07/01/2024   Component Date Value Ref Range Status    POC Rapid COVID 07/01/2024 Negative  Negative Final     Acceptable 07/01/2024 Yes   Final    POC Molecular Influenza A Ag 07/01/2024 Negative  Negative Final    POC Molecular Influenza B Ag 07/01/2024 Negative  Negative Final     Acceptable 07/01/2024 Yes   Final       Past Medical  History:   Diagnosis Date    ADHD (attention deficit hyperactivity disorder)     Concussion 2017 - 2020    Hypertension      History reviewed. No pertinent surgical history.  Family History   Problem Relation Name Age of Onset    Asthma Mother      No Known Problems Sister         All of your core healthy metrics are met.      The CVD Risk score (MAK'Agostino, et al., 2008) failed to calculate for the following reasons:    The 2008 CVD risk score is only valid for ages 30 to 74     Marital Status: Single  Alcohol History:  reports current alcohol use.  Tobacco History:  reports that she quit smoking about 4 years ago. Her smoking use included cigarettes. She has never used smokeless tobacco.  Drug History:  reports no history of drug use.    Health Maintenance Topics with due status: Not Due       Topic Last Completion Date    Pap Smear 05/16/2024    RSV Vaccine (Age 60+ and Pregnant patients) Not Due     Immunization History   Administered Date(s) Administered    DTaP 1999, 1999, 1999, 11/27/2000, 06/02/2003    HPV Quadrivalent 02/03/2011, 05/10/2011, 11/23/2011, 01/01/2013    Hep B / HiB 1999    Hepatitis A, Pediatric/Adolescent, 2 Dose 03/23/2017    Hepatitis B 1999, 02/22/2000    HiB PRP-T 1999, 1999    Hib-HbOC 08/08/2000    IPV 1999, 1999, 05/09/2000, 06/02/2003    Influenza 11/04/2010, 11/23/2011, 11/19/2013    Influenza - Intranasal 11/12/2012    Influenza A (H1N1) 2009 Monovalent - IM 11/12/2009    MMR 05/09/2000, 06/02/2003    Meningococcal Conjugate (MCV4P) 07/08/2010, 06/29/2016    Pneumococcal Conjugate - 7 Valent 05/31/2001    Tdap 07/08/2010    Varicella 05/09/2000, 06/19/2007       Review of patient's allergies indicates:  No Known Allergies    Current Outpatient Medications:     naproxen (NAPROSYN) 500 MG tablet, Take 1 tablet (500 mg total) by mouth 2 (two) times daily as needed (for Headaches)., Disp: 60 tablet, Rfl: 3    naproxen (NAPROSYN) 500  "MG tablet, Take 1 tablet (500 mg total) by mouth 2 (two) times daily as needed (headache). Take with food, Disp: 60 tablet, Rfl: 3    ondansetron (ZOFRAN-ODT) 4 MG TbDL, Take 1 tablet (4 mg total) by mouth every 6 (six) hours as needed (nausea/car sickness)., Disp: 20 tablet, Rfl: 2    sumatriptan (IMITREX) 50 MG tablet, Take 1 tablet at onset of headache- may repeat in 2 hours if needed. Max dose 2tabs/24 hours, Disp: 18 tablet, Rfl: 3    Review of Systems   Constitutional:  Positive for unexpected weight change (wt up 20lbs over past year). Negative for appetite change, chills and fever.   HENT:  Negative for ear discharge, ear pain, sore throat and trouble swallowing.    Eyes:  Positive for photophobia (with headache) and visual disturbance (with headache).   Respiratory:  Negative for cough, shortness of breath and wheezing.    Cardiovascular:  Negative for chest pain, palpitations and leg swelling.   Gastrointestinal:  Positive for nausea (when riding in car). Negative for abdominal pain, constipation, diarrhea and vomiting.   Genitourinary:  Negative for dysuria, frequency and hematuria.   Musculoskeletal:  Negative for back pain and gait problem.   Skin:  Negative for rash.   Neurological:  Positive for headaches (see HPI). Negative for dizziness, syncope and numbness.   Psychiatric/Behavioral:  Negative for dysphoric mood. The patient is not nervous/anxious.           Objective:      Vitals:    12/03/24 1557   BP: 110/70   Pulse: 82   SpO2: 100%   Weight: 75.3 kg (166 lb)   Height: 5' 5" (1.651 m)     Physical Exam  Vitals reviewed.   Constitutional:       General: She is not in acute distress.     Appearance: Normal appearance. She is well-developed and normal weight.   HENT:      Head: Normocephalic and atraumatic.      Right Ear: Tympanic membrane and ear canal normal.      Left Ear: Tympanic membrane and ear canal normal.   Neck:      Vascular: No carotid bruit.   Cardiovascular:      Rate and Rhythm: " Normal rate and regular rhythm.      Heart sounds: No murmur heard.  Pulmonary:      Effort: Pulmonary effort is normal. No respiratory distress.      Breath sounds: Normal breath sounds. No wheezing or rales.   Abdominal:      General: There is no distension.      Palpations: Abdomen is soft.      Tenderness: There is no abdominal tenderness.   Musculoskeletal:      Cervical back: Neck supple.      Right lower leg: No edema.      Left lower leg: No edema.   Lymphadenopathy:      Cervical: No cervical adenopathy.   Skin:     General: Skin is warm and dry.      Findings: No rash.   Neurological:      General: No focal deficit present.      Mental Status: She is alert and oriented to person, place, and time.      Cranial Nerves: No cranial nerve deficit.      Gait: Gait normal.   Psychiatric:         Mood and Affect: Mood normal.           Assessment:       1. Annual physical exam    2. Migraine with aura and without status migrainosus, not intractable    3. Car sickness, initial encounter           Plan:       1. Annual physical exam   -reviewed recent labs with pt- mildly elevated TG and LDL. Discussed imp of healthy diet, needs to cut out fast food and encouraged protein/vegetables as well as regular exercise    2. Migraine with aura and without status migrainosus, not intractable   -stable, sumatriptan effective    3. Car sickness, initial encounter  -will prescribe zofran prn. Cautioned to avoid phone use/reading while riding in car and looking at [place]  -     ondansetron (ZOFRAN-ODT) 4 MG TbDL; Take 1 tablet (4 mg total) by mouth every 6 (six) hours as needed (nausea/car sickness).  Dispense: 20 tablet; Refill: 2      Follow up in about 6 months (around 6/3/2025).          Counseled on age and gender appropriate medical preventative services, including cancer screenings, immunizations, overall nutritional health, need for a consistent exercise regimen and an overall push towards maintaining a vigorous and  active lifestyle.      12/3/2024 Denisha Duran NP

## 2025-02-15 ENCOUNTER — OFFICE VISIT (OUTPATIENT)
Dept: URGENT CARE | Facility: CLINIC | Age: 25
End: 2025-02-15
Payer: COMMERCIAL

## 2025-02-15 VITALS
BODY MASS INDEX: 29.16 KG/M2 | SYSTOLIC BLOOD PRESSURE: 123 MMHG | OXYGEN SATURATION: 99 % | HEART RATE: 91 BPM | RESPIRATION RATE: 15 BRPM | DIASTOLIC BLOOD PRESSURE: 81 MMHG | WEIGHT: 175 LBS | TEMPERATURE: 98 F | HEIGHT: 65 IN

## 2025-02-15 DIAGNOSIS — S05.01XA ABRASION OF RIGHT CORNEA, INITIAL ENCOUNTER: Primary | ICD-10-CM

## 2025-02-15 RX ORDER — NEOMYCIN SULFATE, POLYMYXIN B SULFATE, AND DEXAMETHASONE 3.5; 10000; 1 MG/G; [USP'U]/G; MG/G
OINTMENT OPHTHALMIC EVERY 4 HOURS
Qty: 7 G | Refills: 0 | Status: SHIPPED | OUTPATIENT
Start: 2025-02-15 | End: 2025-02-15 | Stop reason: CLARIF

## 2025-02-15 RX ORDER — NEOMYCIN SULFATE, POLYMYXIN B SULFATE AND DEXAMETHASONE 3.5; 10000; 1 MG/ML; [USP'U]/ML; MG/ML
1 SUSPENSION/ DROPS OPHTHALMIC
Qty: 5 ML | Refills: 0 | Status: SHIPPED | OUTPATIENT
Start: 2025-02-15

## 2025-02-15 NOTE — PROGRESS NOTES
"Subjective:      Patient ID: Jose G Fischer is a 24 y.o. female.    Vitals:  height is 5' 5" (1.651 m) and weight is 79.4 kg (175 lb). Her oral temperature is 98 °F (36.7 °C). Her blood pressure is 123/81 and her pulse is 91. Her respiration is 15 and oxygen saturation is 99%.     Chief Complaint: Eye Problem    Eye Problem   The right eye is affected. This is a new problem. The current episode started yesterday. The problem has been gradually worsening. The injury mechanism was a direct trauma. The pain is at a severity of 8/10. The pain is severe. There is No known exposure to pink eye. She Does not wear contacts. Associated symptoms include blurred vision and eye redness. She has tried eye drops for the symptoms. The treatment provided no relief.       Eyes:  Positive for eye trauma, eye pain, eye redness, blurred vision and eyelid swelling.      Objective:     Physical Exam   Eyes: Pupils are equal, round, and reactive to light.      Comments: Eye stain after local anesthesia. Small cornea abrasion noted on staining. It is in the 615 position and approx 1-2 mm. Irrigation and flipped lids though low risk of FOD is extremely low. Pt tolerated well and stated relief from pain.    Abdominal: Normal appearance.   Neurological: She is alert.   Vitals reviewed.      Assessment:     1. Abrasion of right cornea, initial encounter      Vision Screening    Right eye Left eye Both eyes   Without correction 20/70 20/25 20/30   With correction          Plan:       Abrasion of right cornea, initial encounter    Other orders  -     Discontinue: neomycin-polymyxin-dexamethasone (DEXACINE) 3.5 mg/g-10,000 unit/g-0.1 % Oint; Place into the right eye every 4 (four) hours. As directed by provider for 3 days  Dispense: 7 g; Refill: 0  -     neomycin-polymyxin-dexamethasone (MAXITROL) 3.5mg/mL-10,000 unit/mL-0.1 % DrpS; Place 1 drop into the right eye every 3 (three) hours.  Dispense: 5 mL; Refill: 0                In excessive " of 40 minutes total time spent for evaluation and management services. Time included elements of the following: time spent preparing to see patient, obtaining and reviewing separately obtained history, exam, evaluation, counseling and education of patient/family member or care giver, documenting in the HMR, independently interpreting results and communication of results, coordination of care ordering medications, tests, or procedures, referral and communication to other health care professionals.      Patient Instructions   Limit eye movements.   Avoid going prolonged times without blinking.  Follow up tomorrow.

## 2025-02-16 ENCOUNTER — OFFICE VISIT (OUTPATIENT)
Dept: URGENT CARE | Facility: CLINIC | Age: 25
End: 2025-02-16
Payer: COMMERCIAL

## 2025-02-16 VITALS
HEART RATE: 75 BPM | TEMPERATURE: 98 F | OXYGEN SATURATION: 99 % | RESPIRATION RATE: 15 BRPM | HEIGHT: 65 IN | WEIGHT: 175 LBS | BODY MASS INDEX: 29.16 KG/M2 | SYSTOLIC BLOOD PRESSURE: 124 MMHG | DIASTOLIC BLOOD PRESSURE: 81 MMHG

## 2025-02-16 DIAGNOSIS — S05.01XD ABRASION OF RIGHT CORNEA, SUBSEQUENT ENCOUNTER: Primary | ICD-10-CM

## 2025-02-16 RX ORDER — HYDROCODONE BITARTRATE AND ACETAMINOPHEN 5; 325 MG/1; MG/1
1 TABLET ORAL EVERY 6 HOURS PRN
Qty: 10 TABLET | Refills: 0 | Status: SHIPPED | OUTPATIENT
Start: 2025-02-16 | End: 2025-02-16

## 2025-02-16 RX ORDER — ONDANSETRON 4 MG/1
4 TABLET, ORALLY DISINTEGRATING ORAL EVERY 6 HOURS PRN
Qty: 20 TABLET | Refills: 0 | Status: SHIPPED | OUTPATIENT
Start: 2025-02-16 | End: 2025-02-16

## 2025-02-16 RX ORDER — ONDANSETRON 4 MG/1
4 TABLET, ORALLY DISINTEGRATING ORAL EVERY 6 HOURS PRN
Qty: 20 TABLET | Refills: 0 | Status: SHIPPED | OUTPATIENT
Start: 2025-02-16

## 2025-02-16 RX ORDER — MOXIFLOXACIN 5 MG/ML
1 SOLUTION/ DROPS OPHTHALMIC 3 TIMES DAILY
Qty: 3 ML | Refills: 0 | Status: SHIPPED | OUTPATIENT
Start: 2025-02-16 | End: 2025-02-23

## 2025-02-16 RX ORDER — MOXIFLOXACIN 5 MG/ML
1 SOLUTION/ DROPS OPHTHALMIC 3 TIMES DAILY
Qty: 3 ML | Refills: 0 | Status: SHIPPED | OUTPATIENT
Start: 2025-02-16 | End: 2025-02-16

## 2025-02-16 RX ORDER — HYDROCODONE BITARTRATE AND ACETAMINOPHEN 5; 325 MG/1; MG/1
1 TABLET ORAL EVERY 6 HOURS PRN
Qty: 10 TABLET | Refills: 0 | Status: SHIPPED | OUTPATIENT
Start: 2025-02-16

## 2025-02-16 NOTE — PATIENT INSTRUCTIONS
Norco for breakthrough pain only.  Do not take any additional Tylenol.  Manage pain with ibuprofen.  Follow up with ophthalmologist with completion of antibiotic drops in 7 days
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
## 2025-05-15 ENCOUNTER — OFFICE VISIT (OUTPATIENT)
Dept: URGENT CARE | Facility: CLINIC | Age: 25
End: 2025-05-15
Payer: COMMERCIAL

## 2025-05-15 VITALS
OXYGEN SATURATION: 99 % | DIASTOLIC BLOOD PRESSURE: 81 MMHG | WEIGHT: 170 LBS | HEIGHT: 65 IN | BODY MASS INDEX: 28.32 KG/M2 | TEMPERATURE: 98 F | HEART RATE: 82 BPM | RESPIRATION RATE: 17 BRPM | SYSTOLIC BLOOD PRESSURE: 119 MMHG

## 2025-05-15 DIAGNOSIS — S05.01XA ABRASION OF RIGHT CORNEA, INITIAL ENCOUNTER: Primary | ICD-10-CM

## 2025-05-15 PROCEDURE — 99214 OFFICE O/P EST MOD 30 MIN: CPT | Mod: S$GLB,,,

## 2025-05-15 RX ORDER — ERYTHROMYCIN 5 MG/G
OINTMENT OPHTHALMIC 3 TIMES DAILY
Qty: 3.5 G | Refills: 0 | Status: SHIPPED | OUTPATIENT
Start: 2025-05-15 | End: 2025-05-22

## 2025-05-15 NOTE — PROGRESS NOTES
"Subjective:      Patient ID: Jose G Fischer is a 25 y.o. female.    Vitals:  height is 5' 5" (1.651 m) and weight is 77.1 kg (170 lb). Her temperature is 97.8 °F (36.6 °C). Her blood pressure is 119/81 and her pulse is 82. Her respiration is 17 and oxygen saturation is 99%.     Chief Complaint: Eye Problem    Patient states she had a corneal abrasion on her right eye back in February and states he healed but last night her eye started hurting again just like it felt back then. Patient denies any new trauma or injury to the eye.  She reports increased tearing to the eye and discomfort.  Upon fluorescein stain and Wood's lamp exam there is a small pinpoint area of fluorescein uptake above the cornea where patient states the initial injury and abrasion was.  Given the fact that there is no new trauma or irritation to the eye I have suspicion this could be scarring from the initial incident and recommended follow up with Ophthalmology for re-evaluation    Eye Problem   Associated symptoms include an eye discharge (watering). Pertinent negatives include no blurred vision or eye redness.       Eyes:  Positive for eye discharge (watering) and eye pain. Negative for eye redness, blurred vision and eyelid swelling.      Objective:     Physical Exam   HENT:   Head: Normocephalic and atraumatic.   Eyes: Right eye exhibits discharge (increased tearing). Right conjunctiva is not injected.     vision grossly intact      Comments: Pinpoint fluorescein uptake noted by pink alaina on image   Neurological: She is alert.   Nursing note and vitals reviewed.      Assessment:     1. Abrasion of right cornea, initial encounter        Plan:       Abrasion of right cornea, initial encounter  -     erythromycin (ROMYCIN) ophthalmic ointment; Place into the left eye 3 (three) times daily. for 7 days  Dispense: 3.5 g; Refill: 0      Advised patient to follow up with Ophthalmology for re-evaluation.  Discussed I am uncertain if this is " residual scarring from initial incident as it is in the same location and there was no new trauma.  Discussed medication with patient who acknowledges understanding and is agreeable to POC. Follow up with primary care. Increase fluid intake. Red flags for ER discussed.

## 2025-05-15 NOTE — LETTER
May 15, 2025      [facility]  [address]  [facility] [address]  Phone: [phone]       Patient: Jose G Fischer   YOB: 2000  Date of Visit: 05/15/2025    To Whom It May Concern:    Lyly Fischer  was at [facility] on 05/15/2025. The patient may return to work on 05/16/2025 with no restrictions. If you have any questions or concerns, or if I can be of further assistance, please do not hesitate to contact me.    Sincerely,    Julieth Perez NP

## 2025-06-03 ENCOUNTER — OFFICE VISIT (OUTPATIENT)
Dept: FAMILY MEDICINE | Facility: CLINIC | Age: 25
End: 2025-06-03
Payer: COMMERCIAL

## 2025-06-03 VITALS
OXYGEN SATURATION: 97 % | HEIGHT: 65 IN | SYSTOLIC BLOOD PRESSURE: 116 MMHG | DIASTOLIC BLOOD PRESSURE: 66 MMHG | BODY MASS INDEX: 29.16 KG/M2 | HEART RATE: 94 BPM | WEIGHT: 175 LBS

## 2025-06-03 DIAGNOSIS — G43.109 MIGRAINE WITH AURA AND WITHOUT STATUS MIGRAINOSUS, NOT INTRACTABLE: ICD-10-CM

## 2025-06-03 DIAGNOSIS — Z00.00 ANNUAL PHYSICAL EXAM: Primary | ICD-10-CM

## 2025-06-03 PROCEDURE — 99395 PREV VISIT EST AGE 18-39: CPT | Mod: S$GLB,,, | Performed by: NURSE PRACTITIONER

## 2025-06-03 RX ORDER — SUMATRIPTAN SUCCINATE 50 MG/1
TABLET ORAL
Qty: 18 TABLET | Refills: 2 | Status: SHIPPED | OUTPATIENT
Start: 2025-06-03